# Patient Record
Sex: FEMALE | Race: WHITE | Employment: FULL TIME | URBAN - METROPOLITAN AREA
[De-identification: names, ages, dates, MRNs, and addresses within clinical notes are randomized per-mention and may not be internally consistent; named-entity substitution may affect disease eponyms.]

---

## 2017-02-25 ENCOUNTER — APPOINTMENT (OUTPATIENT)
Dept: CT IMAGING | Age: 27
DRG: 694 | End: 2017-02-25
Attending: NURSE PRACTITIONER
Payer: COMMERCIAL

## 2017-02-25 ENCOUNTER — HOSPITAL ENCOUNTER (INPATIENT)
Age: 27
LOS: 4 days | Discharge: HOME | DRG: 694 | End: 2017-03-02
Attending: EMERGENCY MEDICINE | Admitting: HOSPITALIST
Payer: COMMERCIAL

## 2017-02-25 DIAGNOSIS — N20.0 RENAL CALCULUS, LEFT: Primary | ICD-10-CM

## 2017-02-25 DIAGNOSIS — N12 PYELONEPHRITIS: ICD-10-CM

## 2017-02-25 DIAGNOSIS — R50.9 FEVER IN ADULT: ICD-10-CM

## 2017-02-25 LAB
ALBUMIN SERPL BCP-MCNC: 3.3 G/DL (ref 3.4–5)
ALBUMIN/GLOB SERPL: 1.2 {RATIO} (ref 0.8–1.7)
ALP SERPL-CCNC: 63 U/L (ref 45–117)
ALT SERPL-CCNC: 18 U/L (ref 13–56)
ANION GAP BLD CALC-SCNC: 9 MMOL/L (ref 3–18)
APPEARANCE UR: ABNORMAL
AST SERPL W P-5'-P-CCNC: 11 U/L (ref 15–37)
BACTERIA URNS QL MICRO: ABNORMAL /HPF
BASOPHILS # BLD AUTO: 0 K/UL (ref 0–0.06)
BASOPHILS # BLD: 0 % (ref 0–2)
BILIRUB SERPL-MCNC: 0.6 MG/DL (ref 0.2–1)
BILIRUB UR QL: NEGATIVE
BUN SERPL-MCNC: 15 MG/DL (ref 7–18)
BUN/CREAT SERPL: 14 (ref 12–20)
CALCIUM SERPL-MCNC: 7.9 MG/DL (ref 8.5–10.1)
CHLORIDE SERPL-SCNC: 104 MMOL/L (ref 100–108)
CO2 SERPL-SCNC: 24 MMOL/L (ref 21–32)
COLOR UR: YELLOW
CREAT SERPL-MCNC: 1.04 MG/DL (ref 0.6–1.3)
DIFFERENTIAL METHOD BLD: ABNORMAL
EOSINOPHIL # BLD: 0 K/UL (ref 0–0.4)
EOSINOPHIL NFR BLD: 0 % (ref 0–5)
EPITH CASTS URNS QL MICRO: ABNORMAL /LPF (ref 0–5)
ERYTHROCYTE [DISTWIDTH] IN BLOOD BY AUTOMATED COUNT: 13.2 % (ref 11.6–14.5)
GLOBULIN SER CALC-MCNC: 2.8 G/DL (ref 2–4)
GLUCOSE SERPL-MCNC: 150 MG/DL (ref 74–99)
GLUCOSE UR STRIP.AUTO-MCNC: NEGATIVE MG/DL
HCG SERPL QL: NEGATIVE
HCT VFR BLD AUTO: 42.1 % (ref 35–45)
HGB BLD-MCNC: 14.2 G/DL (ref 12–16)
HGB UR QL STRIP: ABNORMAL
KETONES UR QL STRIP.AUTO: NEGATIVE MG/DL
LEUKOCYTE ESTERASE UR QL STRIP.AUTO: ABNORMAL
LIPASE SERPL-CCNC: 96 U/L (ref 73–393)
LYMPHOCYTES # BLD AUTO: 6 % (ref 21–52)
LYMPHOCYTES # BLD: 0.7 K/UL (ref 0.9–3.6)
MCH RBC QN AUTO: 29.7 PG (ref 24–34)
MCHC RBC AUTO-ENTMCNC: 33.7 G/DL (ref 31–37)
MCV RBC AUTO: 88.1 FL (ref 74–97)
MONOCYTES # BLD: 0.3 K/UL (ref 0.05–1.2)
MONOCYTES NFR BLD AUTO: 2 % (ref 3–10)
NEUTS SEG # BLD: 10.2 K/UL (ref 1.8–8)
NEUTS SEG NFR BLD AUTO: 92 % (ref 40–73)
NITRITE UR QL STRIP.AUTO: NEGATIVE
PH UR STRIP: 5 [PH] (ref 5–8)
PLATELET # BLD AUTO: 185 K/UL (ref 135–420)
PMV BLD AUTO: 11.5 FL (ref 9.2–11.8)
POTASSIUM SERPL-SCNC: 3.5 MMOL/L (ref 3.5–5.5)
PROT SERPL-MCNC: 6.1 G/DL (ref 6.4–8.2)
PROT UR STRIP-MCNC: NEGATIVE MG/DL
RBC # BLD AUTO: 4.78 M/UL (ref 4.2–5.3)
RBC #/AREA URNS HPF: ABNORMAL /HPF (ref 0–5)
SODIUM SERPL-SCNC: 137 MMOL/L (ref 136–145)
SP GR UR REFRACTOMETRY: 1.02 (ref 1–1.03)
UROBILINOGEN UR QL STRIP.AUTO: 0.2 EU/DL (ref 0.2–1)
WBC # BLD AUTO: 11.2 K/UL (ref 4.6–13.2)
WBC URNS QL MICRO: ABNORMAL /HPF (ref 0–4)

## 2017-02-25 PROCEDURE — 99284 EMERGENCY DEPT VISIT MOD MDM: CPT

## 2017-02-25 PROCEDURE — 85025 COMPLETE CBC W/AUTO DIFF WBC: CPT | Performed by: NURSE PRACTITIONER

## 2017-02-25 PROCEDURE — 84703 CHORIONIC GONADOTROPIN ASSAY: CPT | Performed by: NURSE PRACTITIONER

## 2017-02-25 PROCEDURE — 96365 THER/PROPH/DIAG IV INF INIT: CPT

## 2017-02-25 PROCEDURE — 74011250636 HC RX REV CODE- 250/636: Performed by: NURSE PRACTITIONER

## 2017-02-25 PROCEDURE — 81001 URINALYSIS AUTO W/SCOPE: CPT | Performed by: EMERGENCY MEDICINE

## 2017-02-25 PROCEDURE — 87077 CULTURE AEROBIC IDENTIFY: CPT | Performed by: NURSE PRACTITIONER

## 2017-02-25 PROCEDURE — 87086 URINE CULTURE/COLONY COUNT: CPT | Performed by: NURSE PRACTITIONER

## 2017-02-25 PROCEDURE — 96376 TX/PRO/DX INJ SAME DRUG ADON: CPT

## 2017-02-25 PROCEDURE — 74176 CT ABD & PELVIS W/O CONTRAST: CPT

## 2017-02-25 PROCEDURE — 96375 TX/PRO/DX INJ NEW DRUG ADDON: CPT

## 2017-02-25 PROCEDURE — 87186 SC STD MICRODIL/AGAR DIL: CPT | Performed by: NURSE PRACTITIONER

## 2017-02-25 PROCEDURE — 83690 ASSAY OF LIPASE: CPT | Performed by: EMERGENCY MEDICINE

## 2017-02-25 PROCEDURE — 96361 HYDRATE IV INFUSION ADD-ON: CPT

## 2017-02-25 PROCEDURE — 74011250637 HC RX REV CODE- 250/637: Performed by: NURSE PRACTITIONER

## 2017-02-25 PROCEDURE — 80053 COMPREHEN METABOLIC PANEL: CPT | Performed by: EMERGENCY MEDICINE

## 2017-02-25 RX ORDER — MORPHINE SULFATE 4 MG/ML
4 INJECTION, SOLUTION INTRAMUSCULAR; INTRAVENOUS
Status: COMPLETED | OUTPATIENT
Start: 2017-02-25 | End: 2017-02-25

## 2017-02-25 RX ORDER — KETOROLAC TROMETHAMINE 30 MG/ML
30 INJECTION, SOLUTION INTRAMUSCULAR; INTRAVENOUS
Status: COMPLETED | OUTPATIENT
Start: 2017-02-25 | End: 2017-02-25

## 2017-02-25 RX ORDER — CIPROFLOXACIN 2 MG/ML
400 INJECTION, SOLUTION INTRAVENOUS
Status: COMPLETED | OUTPATIENT
Start: 2017-02-25 | End: 2017-02-25

## 2017-02-25 RX ORDER — ONDANSETRON 2 MG/ML
4 INJECTION INTRAMUSCULAR; INTRAVENOUS
Status: COMPLETED | OUTPATIENT
Start: 2017-02-25 | End: 2017-02-25

## 2017-02-25 RX ORDER — TAMSULOSIN HYDROCHLORIDE 0.4 MG/1
0.4 CAPSULE ORAL
Status: DISCONTINUED | OUTPATIENT
Start: 2017-02-25 | End: 2017-02-26

## 2017-02-25 RX ORDER — ACETAMINOPHEN 325 MG/1
650 TABLET ORAL
Status: COMPLETED | OUTPATIENT
Start: 2017-02-25 | End: 2017-02-25

## 2017-02-25 RX ORDER — ACETAMINOPHEN 325 MG/1
975 TABLET ORAL
Status: DISCONTINUED | OUTPATIENT
Start: 2017-02-25 | End: 2017-02-25

## 2017-02-25 RX ADMIN — KETOROLAC TROMETHAMINE 30 MG: 30 INJECTION, SOLUTION INTRAMUSCULAR at 19:52

## 2017-02-25 RX ADMIN — Medication 4 MG: at 19:54

## 2017-02-25 RX ADMIN — TAMSULOSIN HYDROCHLORIDE 0.4 MG: 0.4 CAPSULE ORAL at 23:51

## 2017-02-25 RX ADMIN — CIPROFLOXACIN 400 MG: 2 INJECTION, SOLUTION INTRAVENOUS at 21:20

## 2017-02-25 RX ADMIN — SODIUM CHLORIDE 1000 ML: 900 INJECTION, SOLUTION INTRAVENOUS at 21:21

## 2017-02-25 RX ADMIN — Medication 4 MG: at 22:03

## 2017-02-25 RX ADMIN — ACETAMINOPHEN 650 MG: 325 TABLET, FILM COATED ORAL at 19:59

## 2017-02-25 RX ADMIN — SODIUM CHLORIDE 1000 ML: 900 INJECTION, SOLUTION INTRAVENOUS at 19:50

## 2017-02-25 RX ADMIN — ONDANSETRON 4 MG: 2 INJECTION INTRAMUSCULAR; INTRAVENOUS at 22:02

## 2017-02-25 NOTE — IP AVS SNAPSHOT
[address] [place] [address] 
[phone] Patient: Jeff Frances MRN: [id number] :1990 You are allergic to the following Allergen Reactions Pcn (Penicillins) Rash Other (comments) Stomach cramps Recent Documentation Height  
  
  
  
  
  
 1.676 m Unresulted Labs Order Current Status CULTURE, BLOOD Preliminary result CULTURE, BLOOD Preliminary result Emergency Contacts Name Discharge Info Relation Home Work Mobile Yuri Edwards DISCHARGE CAREGIVER [3] Spouse [3] [phone] Judy Moon  Parent [1] [phone] About your hospitalization You were admitted on:  2017 You last received care in the:  [address] [place] [address] Road You were discharged on:  2017 Unit phone number:  [phone] Why you were hospitalized Your primary diagnosis was:  Not on File Your diagnoses also included:  Renal Calculi, Pyelonephritis Providers Seen During Your Hospitalizations Provider Role Specialty Primary office phone Calli Bearden DO Attending Provider Emergency Medicine [phone] Deisi Villavicencio MD Attending Provider [facility] [phone] Jaz Bergeron DO Attending Provider Internal Medicine [phone] Socorro Reyes MD Attending Provider Internal Medicine [phone] Your Primary Care Physician (PCP) Primary Care Physician Office Phone Office Fax [facility], [address] [place] [address] [phone] Follow-up Information Follow up With Details Comments Contact Info Brent Breen MD Schedule an appointment as soon as possible for a visit in 1 week  [facility] [address] [facility] [phone] 
[phone] Your Appointments 2017  9:15 AM EDT  
[facility] ([address]) [address] [address]  
[phone] Monday March 20, 2017  9:30 AM EDT  
POST OP with Marbin Kebede MD  
[facility]. Aron Mehta 98 ([facility]) [address] [address]  
[phone] Current Discharge Medication List  
  
START taking these medications Dose & Instructions Dispensing Information Comments Morning Noon Evening Bedtime  
 levoFLOXacin 750 mg tablet Commonly known as:  Threasa Mince Your next dose is: Today, Tomorrow Other:  _________ Dose:  750 mg Take 1 Tab by mouth daily. Quantity:  10 Tab Refills:  0  
     
   
   
   
  
 ondansetron 4 mg disintegrating tablet Commonly known as:  ZOFRAN ODT Your next dose is: Today, Tomorrow Other:  _________ Dose:  4 mg Take 1 Tab by mouth every eight (8) hours as needed. Quantity:  30 Tab Refills:  0  
     
   
   
   
  
 oxybutynin 5 mg tablet Commonly known as:  LNQZUSNF Your next dose is: Today, Tomorrow Other:  _________ Dose:  5 mg Take 1 Tab by mouth every eight (8) hours. Quantity:  60 Tab Refills:  0  
     
   
   
   
  
 oxyCODONE-acetaminophen 7.5-325 mg per tablet Commonly known as:  PERCOCET Your next dose is: Today, Tomorrow Other:  _________ Dose:  1 Tab Take 1 Tab by mouth every four (4) hours as needed for Pain. Max Daily Amount: 6 Tabs. Quantity:  30 Tab Refills:  0  
     
   
   
   
  
 tamsulosin 0.4 mg capsule Commonly known as:  FLOMAX Your next dose is: Today, Tomorrow Other:  _________ Dose:  0.4 mg Take 1 Cap by mouth every evening. Quantity:  30 Cap Refills:  0 CONTINUE these medications which have NOT CHANGED Dose & Instructions Dispensing Information Comments Morning Noon Evening Bedtime TRI-SPRINTEC (28) 0.18/0.215/0.25 mg-35 mcg (28) Tab Generic drug:  norgestimate-ethinyl estradiol Your next dose is: Today, Tomorrow Other:  _________ Take  by mouth. Refills:  0 STOP taking these medications HYDROcodone-acetaminophen 5-325 mg per tablet Commonly known as:  Hector Armstronge Where to Get Your Medications Information on where to get these meds will be given to you by the nurse or doctor. ! Ask your nurse or doctor about these medications  
  levoFLOXacin 750 mg tablet  
 ondansetron 4 mg disintegrating tablet  
 oxybutynin 5 mg tablet  
 oxyCODONE-acetaminophen 7.5-325 mg per tablet  
 tamsulosin 0.4 mg capsule Discharge Instructions DISCHARGE SUMMARY from Nurse The following personal items are in your possession at time of discharge: 
 
Dental Appliances: None Visual Aid: Glasses Home Medications: None Jewelry: Ring (2 silver ring with stones) Clothing: Shirt, Undergarments, Pants, Footwear Other Valuables: Cell Phone PATIENT INSTRUCTIONS: 
 
 
F-face looks uneven A-arms unable to move or move unevenly S-speech slurred or non-existent T-time-call 911 as soon as signs and symptoms begin-DO NOT go Back to bed or wait to see if you get better-TIME IS BRAIN. Warning Signs of HEART ATTACK Call 911 if you have these symptoms: 
? Chest discomfort. Most heart attacks involve discomfort in the center of the chest that lasts more than a few minutes, or that goes away and comes back. It can feel like uncomfortable pressure, squeezing, fullness, or pain. ? Discomfort in other areas of the upper body. Symptoms can include pain or discomfort in one or both arms, the back, neck, jaw, or stomach. ? Shortness of breath with or without chest discomfort. ? Other signs may include breaking out in a cold sweat, nausea, or lightheadedness. Don't wait more than five minutes to call [address]! Fast action can save your life. Calling 911 is almost always the fastest way to get lifesaving treatment. Emergency Medical Services staff can begin treatment when they arrive  up to an hour sooner than if someone gets to the hospital by car. The discharge information has been reviewed with the patient. The patient verbalized understanding. Discharge medications reviewed with the patient Kidney Stone: Care Instructions Your Care Instructions Kidney stones are formed when salts, minerals, and other substances normally found in the urine clump together. They can be as small as grains of sand or, rarely, as large as golf balls. While the stone is traveling through the ureter, which is the tube that carries urine from the kidney to the bladder, you will probably feel pain. The pain may be mild or very severe. You may also have some blood in your urine. As soon as the stone reaches the bladder, any intense pain should go away. If a stone is too large to pass on its own, you may need a medical procedure to help you pass the stone. The doctor has checked you carefully, but problems can develop later. If you notice any problems or new symptoms, get medical treatment right away. Follow-up care is a key part of your treatment and safety. Be sure to make and go to all appointments, and call your doctor if you are having problems. It's also a good idea to know your test results and keep a list of the medicines you take. How can you care for yourself at home?  
· Drink plenty of fluids, enough so that your urine is light yellow or clear like water. If you have kidney, heart, or liver disease and have to limit fluids, talk with your doctor before you increase the amount of fluids you drink. · Take pain medicines exactly as directed. Call your doctor if you think you are having a problem with your medicine. ¨ If the doctor gave you a prescription medicine for pain, take it as prescribed. ¨ If you are not taking a prescription pain medicine, ask your doctor if you can take an over-the-counter medicine. Read and follow all instructions on the label. · Your doctor may ask you to strain your urine so that you can collect your kidney stone when it passes. You can use a kitchen strainer or a tea strainer to catch the stone. Store it in a plastic bag until you see your doctor again. Preventing future kidney stones Some changes in your diet may help prevent kidney stones. Depending on the cause of your stones, your doctor may recommend that you: · Drink plenty of fluids, enough so that your urine is light yellow or clear like water. If you have kidney, heart, or liver disease and have to limit fluids, talk with your doctor before you increase the amount of fluids you drink. · Limit coffee, tea, and alcohol. Also avoid grapefruit juice. · Do not take more than the recommended daily dose of vitamins C and D. 
· Avoid antacids such as Gaviscon, Maalox, Mylanta, or Tums. · Limit the amount of salt (sodium) in your diet. · Eat a balanced diet that is not too high in protein. · Limit foods that are high in a substance called oxalate, which can cause kidney stones. These foods include dark green vegetables, rhubarb, chocolate, wheat bran, nuts, cranberries, and beans. When should you call for help? Call your doctor now or seek immediate medical care if: 
· You cannot keep down fluids. · Your pain gets worse. · You have a fever or chills. · You have new or worse pain in your back just below your rib cage (the flank area). · You have new or more blood in your urine. Watch closely for changes in your health, and be sure to contact your doctor if: 
· You do not get better as expected. Where can you learn more? Go to http://giovanny-stewart.info/. Enter L399 in the search box to learn more about \"Kidney Stone: Care Instructions. \" Current as of: November 20, 2015 Content Version: 11.1 © 0919-4130 [employer]. Care instructions adapted under license by [employer] on original products (which disclaims liability or warranty for this information). If you have questions about a medical condition or this instruction, always ask your healthcare professional. [address] any warranty or liability for your use of this information. and appropriate educational materials and side effects teaching were provided. Discharge Instructions Attachments/References LEVOFLOXACIN (BY MOUTH) (ENGLISH) Discharge Orders None TipTap Announcement We are excited to announce that we are making your provider's discharge notes available to you in TipTap. You will see these notes when they are completed and signed by the physician that discharged you from your recent hospital stay. If you have any questions or concerns about any information you see in TipTap, please call the Health Information Department where you were seen or reach out to your Primary Care Provider for more information about your plan of care. Introducing [facility]! Stefanie Nair introduces TipTap patient portal. Now you can access parts of your medical record, email your doctor's office, and request medication refills online. 1. In your internet browser, go to [email or web address] 2. Click on the First Time User? Click Here link in the Sign In box. You will see the New Member Sign Up page. 3. Enter your TipTap Access Code exactly as it appears below.  You will not need to use this code after youve completed the sign-up process. If you do not sign up before the expiration date, you must request a new code. · Property Owl Access Code: [email or web address] Expires: 5/26/2017  6:26 PM 
 
4. Enter the last four digits of your Social Security Number (xxxx) and Date of Birth (mm/dd/yyyy) as indicated and click Submit. You will be taken to the next sign-up page. 5. Create a Property Owl ID. This will be your Property Owl login ID and cannot be changed, so think of one that is secure and easy to remember. 6. Create a Property Owl password. You can change your password at any time. 7. Enter your Password Reset Question and Answer. This can be used at a later time if you forget your password. 8. Enter your e-mail address. You will receive e-mail notification when new information is available in [address] 9. Click Sign Up. You can now view and download portions of your medical record. 10. Click the Download Summary menu link to download a portable copy of your medical information. If you have questions, please visit the Frequently Asked Questions section of the Property Owl website. Remember, Property Owl is NOT to be used for urgent needs. For medical emergencies, dial 911. Now available from your iPhone and Android! General Information Please provide this summary of care documentation to your next provider. Patient Signature:  ____________________________________________________________ Date:  ____________________________________________________________  
  
Coleman Shelby Provider Signature:  ____________________________________________________________ Date:  ____________________________________________________________ More Information Levofloxacin (By mouth) Levofloxacin (hie-kod-XPWU-a-sin) Treats infections. This medicine is a quinolone antibiotic. Brand Name(s):Levaquin, Levaquin Leva-roni There may be other brand names for this medicine. When This Medicine Should Not Be Used: This medicine is not right for everyone. Do not use if you had an allergic reaction to levofloxacin or similar medicines. How to Use This Medicine:  
Liquid, Tablet · Your doctor will tell you how much medicine to use. Do not use more than directed. · Take your medicine at the same time each day. ¨ Tablet: Take the tablet with or without food. ¨ Liquid: Take the liquid medicine 1 hour before or 2 hours after you eat. Measure the oral liquid medicine with a marked measuring spoon, oral syringe, or medicine cup. · Take all of the medicine in your prescription to clear up your infection, even if you feel better after the first few doses. · Drink extra fluids so you will urinate more often and help prevent kidney problems. · This medicine should come with a Medication Guide. Ask your pharmacist for a copy if you do not have one. · Missed dose: Take a dose as soon as you remember. If it is almost time for your next dose, wait until then and take a regular dose. Do not take extra medicine to make up for a missed dose. · Store the medicine in a closed container at room temperature, away from heat, moisture, and direct light. Drugs and Foods to Avoid: Ask your doctor or pharmacist before using any other medicine, including over-the-counter medicines, vitamins, and herbal products. · Some medicines and foods can affect how levofloxacin works. Tell your doctor if you are using any of the following: ¨ Theophylline ¨ Steroid medicine (such as hydrocortisone, methylprednisolone, prednisone) ¨ Blood thinner (such as warfarin) ¨ Diabetes medicine ¨ NSAID pain or arthritis medicine (such as aspirin, diclofenac, ibuprofen, naproxen, celecoxib) ¨ Medicine for heart rhythm problems (such as quinidine, procainamide, amiodarone, sotalol) · Some minerals and medicines can keep your body from absorbing this medicine. You may need to take levofloxacin at least 2 hours before or after you take these medicines. These include antacids that contain magnesium or aluminum; magnesium, zinc, or iron supplements; sucralfate; and didanosine. Ask your pharmacist for more information. Warnings While Using This Medicine: · Tell your doctor if you are pregnant or breastfeeding, or if you have kidney disease, liver disease, diabetes, heart disease, heart rhythm problems (such as QT prolongation or a slow heartbeat), myasthenia gravis, or a history of seizures, epilepsy, head injury, or stroke. Tell your doctor if you have ever had tendon or joint problems, including rheumatoid arthritis, or if you have received a transplant. · This medicine may cause the following problems: 
¨ Tendinitis and tendon rupture (may happen after treatment ends) ¨ Liver damage ¨ Severe diarrhea ¨ Nerve damage in the arms or legs ¨ Heart rhythm changes ¨ Blood sugar level changes · This medicine may make you feel dizzy or lightheaded. Do not drive or do anything else that could be dangerous until you know how this medicine affects you. · This medicine can cause diarrhea. Call your doctor if the diarrhea becomes severe, does not stop, or is bloody. Do not take any medicine to stop diarrhea until you have talked to your doctor. Diarrhea can occur 2 months or more after you stop taking this medicine. · This medicine may make your skin more sensitive to sunlight. Wear sunscreen. Do not use sunlamps or tanning beds. · Call your doctor if your symptoms do not improve or if they get worse. · Tell any doctor or dentist who treats you that you are using this medicine. This medicine may affect certain medical test results. · Keep all medicine out of the reach of children. Never share your medicine with anyone. Possible Side Effects While Using This Medicine:  
Call your doctor right away if you notice any of these side effects: · Allergic reaction: Itching or hives, swelling in your face or hands, swelling or tingling in your mouth or throat, chest tightness, trouble breathing · Blistering, peeling, or red skin rash · Change in how much or how often you urinate · Dark urine or pale stools, nausea, vomiting, loss of appetite, stomach pain, yellow skin or eyes · Diarrhea that may contain blood · Fainting, dizziness, or lightheadedness · Fast, slow, or uneven heartbeat, chest pain · Numbness, tingling, or burning pain in your hands, arms, legs, or feet · Pain, stiffness, swelling, or bruises around your ankle, leg, shoulder, or other joint · Seizures, severe headache, unusual thoughts or behaviors, trouble sleeping, confusion · Unusual bleeding, bruising, or weakness If you notice these less serious side effects, talk with your doctor: · Mild headache · Mild nausea or diarrhea If you notice other side effects that you think are caused by this medicine, tell your doctor. Call your doctor for medical advice about side effects. You may report side effects to FDA at 3-779-FDA-0552 © 2016 [address] is for End User's use only and may not be sold, redistributed or otherwise used for commercial purposes. The above information is an  only. It is not intended as medical advice for individual conditions or treatments. Talk to your doctor, nurse or pharmacist before following any medical regimen to see if it is safe and effective for you.

## 2017-02-25 NOTE — IP AVS SNAPSHOT
Current Discharge Medication List  
  
Take these medications at their scheduled times Dose & Instructions Dispensing Information Comments Morning Noon Evening Bedtime  
 levoFLOXacin 750 mg tablet Commonly known as:  Blackwelljuju Bautista Your next dose is: Today, Tomorrow Other:  ____________ Dose:  750 mg Take 1 Tab by mouth daily. Quantity:  10 Tab Refills:  0  
     
   
   
   
  
 oxybutynin 5 mg tablet Commonly known as:  VLKKYYCF Your next dose is: Today, Tomorrow Other:  ____________ Dose:  5 mg Take 1 Tab by mouth every eight (8) hours. Quantity:  60 Tab Refills:  0  
     
   
   
   
  
 tamsulosin 0.4 mg capsule Commonly known as:  FLOMAX Your next dose is: Today, Tomorrow Other:  ____________ Dose:  0.4 mg Take 1 Cap by mouth every evening. Quantity:  30 Cap Refills:  0 Take these medications as needed Dose & Instructions Dispensing Information Comments Morning Noon Evening Bedtime  
 ondansetron 4 mg disintegrating tablet Commonly known as:  ZOFRAN ODT Your next dose is: Today, Tomorrow Other:  ____________ Dose:  4 mg Take 1 Tab by mouth every eight (8) hours as needed. Quantity:  30 Tab Refills:  0  
     
   
   
   
  
 oxyCODONE-acetaminophen 7.5-325 mg per tablet Commonly known as:  PERCOCET Your next dose is: Today, Tomorrow Other:  ____________ Dose:  1 Tab Take 1 Tab by mouth every four (4) hours as needed for Pain. Max Daily Amount: 6 Tabs. Quantity:  30 Tab Refills:  0 Take these medications as directed Dose & Instructions Dispensing Information Comments Morning Noon Evening Bedtime TRI-SPRINTEC (28) 0.18/0.215/0.25 mg-35 mcg (28) Tab Generic drug:  norgestimate-ethinyl estradiol Your next dose is: Today, Tomorrow Other:  ____________ Take  by mouth. Refills:  0 Where to Get Your Medications Information about where to get these medications is not yet available ! Ask your nurse or doctor about these medications  
  levoFLOXacin 750 mg tablet  
 ondansetron 4 mg disintegrating tablet  
 oxybutynin 5 mg tablet  
 oxyCODONE-acetaminophen 7.5-325 mg per tablet  
 tamsulosin 0.4 mg capsule

## 2017-02-26 ENCOUNTER — ANESTHESIA EVENT (OUTPATIENT)
Dept: SURGERY | Age: 27
DRG: 694 | End: 2017-02-26
Payer: COMMERCIAL

## 2017-02-26 ENCOUNTER — ANESTHESIA (OUTPATIENT)
Dept: SURGERY | Age: 27
DRG: 694 | End: 2017-02-26
Payer: COMMERCIAL

## 2017-02-26 ENCOUNTER — APPOINTMENT (OUTPATIENT)
Dept: GENERAL RADIOLOGY | Age: 27
DRG: 694 | End: 2017-02-26
Attending: UROLOGY
Payer: COMMERCIAL

## 2017-02-26 PROBLEM — N20.0 RENAL CALCULI: Status: ACTIVE | Noted: 2017-02-26

## 2017-02-26 PROBLEM — N12 PYELONEPHRITIS: Status: ACTIVE | Noted: 2017-02-26

## 2017-02-26 LAB
ALBUMIN SERPL BCP-MCNC: 2.6 G/DL (ref 3.4–5)
ALBUMIN/GLOB SERPL: 1.1 {RATIO} (ref 0.8–1.7)
ALP SERPL-CCNC: 52 U/L (ref 45–117)
ALT SERPL-CCNC: 12 U/L (ref 13–56)
ANION GAP BLD CALC-SCNC: 9 MMOL/L (ref 3–18)
AST SERPL W P-5'-P-CCNC: 12 U/L (ref 15–37)
BASOPHILS # BLD AUTO: 0 K/UL (ref 0–0.1)
BASOPHILS # BLD: 0 % (ref 0–3)
BILIRUB SERPL-MCNC: 0.6 MG/DL (ref 0.2–1)
BUN SERPL-MCNC: 11 MG/DL (ref 7–18)
BUN/CREAT SERPL: 9 (ref 12–20)
CALCIUM SERPL-MCNC: 6.8 MG/DL (ref 8.5–10.1)
CHLORIDE SERPL-SCNC: 113 MMOL/L (ref 100–108)
CO2 SERPL-SCNC: 19 MMOL/L (ref 21–32)
CREAT SERPL-MCNC: 1.17 MG/DL (ref 0.6–1.3)
DIFFERENTIAL METHOD BLD: ABNORMAL
EOSINOPHIL # BLD: 0 K/UL (ref 0–0.4)
EOSINOPHIL NFR BLD: 0 % (ref 0–5)
ERYTHROCYTE [DISTWIDTH] IN BLOOD BY AUTOMATED COUNT: 13.7 % (ref 11.6–14.5)
GLOBULIN SER CALC-MCNC: 2.3 G/DL (ref 2–4)
GLUCOSE SERPL-MCNC: 90 MG/DL (ref 74–99)
HCT VFR BLD AUTO: 36 % (ref 35–45)
HGB BLD-MCNC: 11.6 G/DL (ref 12–16)
LACTATE SERPL-SCNC: 0.8 MMOL/L (ref 0.4–2)
LYMPHOCYTES # BLD AUTO: 3 % (ref 20–51)
LYMPHOCYTES # BLD: 0.3 K/UL (ref 0.8–3.5)
MCH RBC QN AUTO: 29.1 PG (ref 24–34)
MCHC RBC AUTO-ENTMCNC: 32.2 G/DL (ref 31–37)
MCV RBC AUTO: 90.2 FL (ref 74–97)
MONOCYTES # BLD: 0.4 K/UL (ref 0–1)
MONOCYTES NFR BLD AUTO: 4 % (ref 2–9)
NEUTS BAND NFR BLD MANUAL: 9 % (ref 0–5)
NEUTS SEG # BLD: 8.7 K/UL (ref 1.8–8)
NEUTS SEG NFR BLD AUTO: 84 % (ref 42–75)
PLATELET # BLD AUTO: 113 K/UL (ref 135–420)
PMV BLD AUTO: 10.5 FL (ref 9.2–11.8)
POTASSIUM SERPL-SCNC: 3.4 MMOL/L (ref 3.5–5.5)
PROT SERPL-MCNC: 4.9 G/DL (ref 6.4–8.2)
RBC # BLD AUTO: 3.99 M/UL (ref 4.2–5.3)
RBC MORPH BLD: ABNORMAL
SODIUM SERPL-SCNC: 141 MMOL/L (ref 136–145)
WBC # BLD AUTO: 10.4 K/UL (ref 4.6–13.2)

## 2017-02-26 PROCEDURE — 85025 COMPLETE CBC W/AUTO DIFF WBC: CPT | Performed by: UROLOGY

## 2017-02-26 PROCEDURE — 0TJB8ZZ INSPECTION OF BLADDER, VIA NATURAL OR ARTIFICIAL OPENING ENDOSCOPIC: ICD-10-PCS | Performed by: UROLOGY

## 2017-02-26 PROCEDURE — 74011250636 HC RX REV CODE- 250/636: Performed by: NURSE PRACTITIONER

## 2017-02-26 PROCEDURE — 77030034850: Performed by: UROLOGY

## 2017-02-26 PROCEDURE — 77030032490 HC SLV COMPR SCD KNE COVD -B: Performed by: UROLOGY

## 2017-02-26 PROCEDURE — 77030012884 HC SLV COMPR SCD MTEK -B: Performed by: UROLOGY

## 2017-02-26 PROCEDURE — 76060000032 HC ANESTHESIA 0.5 TO 1 HR: Performed by: UROLOGY

## 2017-02-26 PROCEDURE — 77030020263 HC SOL INJ SOD CL0.9% LFCR 1000ML

## 2017-02-26 PROCEDURE — 74011250636 HC RX REV CODE- 250/636

## 2017-02-26 PROCEDURE — 87040 BLOOD CULTURE FOR BACTERIA: CPT | Performed by: UROLOGY

## 2017-02-26 PROCEDURE — 65270000029 HC RM PRIVATE

## 2017-02-26 PROCEDURE — 74011250636 HC RX REV CODE- 250/636: Performed by: UROLOGY

## 2017-02-26 PROCEDURE — 77030018846 HC SOL IRR STRL H20 ICUM -A: Performed by: UROLOGY

## 2017-02-26 PROCEDURE — 80053 COMPREHEN METABOLIC PANEL: CPT | Performed by: UROLOGY

## 2017-02-26 PROCEDURE — 36415 COLL VENOUS BLD VENIPUNCTURE: CPT | Performed by: UROLOGY

## 2017-02-26 PROCEDURE — 71010 XR CHEST PORT: CPT

## 2017-02-26 PROCEDURE — 87086 URINE CULTURE/COLONY COUNT: CPT | Performed by: UROLOGY

## 2017-02-26 PROCEDURE — 74011000250 HC RX REV CODE- 250

## 2017-02-26 PROCEDURE — 77030018836 HC SOL IRR NACL ICUM -A: Performed by: UROLOGY

## 2017-02-26 PROCEDURE — 87186 SC STD MICRODIL/AGAR DIL: CPT | Performed by: UROLOGY

## 2017-02-26 PROCEDURE — C2617 STENT, NON-COR, TEM W/O DEL: HCPCS | Performed by: UROLOGY

## 2017-02-26 PROCEDURE — 74011250637 HC RX REV CODE- 250/637: Performed by: NURSE PRACTITIONER

## 2017-02-26 PROCEDURE — C1769 GUIDE WIRE: HCPCS | Performed by: UROLOGY

## 2017-02-26 PROCEDURE — 0T778DZ DILATION OF LEFT URETER WITH INTRALUMINAL DEVICE, VIA NATURAL OR ARTIFICIAL OPENING ENDOSCOPIC: ICD-10-PCS | Performed by: UROLOGY

## 2017-02-26 PROCEDURE — 74011250636 HC RX REV CODE- 250/636: Performed by: HOSPITALIST

## 2017-02-26 PROCEDURE — 76010000138 HC OR TIME 0.5 TO 1 HR: Performed by: UROLOGY

## 2017-02-26 PROCEDURE — 77030018832 HC SOL IRR H20 ICUM -A: Performed by: UROLOGY

## 2017-02-26 PROCEDURE — 76210000006 HC OR PH I REC 0.5 TO 1 HR: Performed by: UROLOGY

## 2017-02-26 PROCEDURE — 77030012510 HC MSK AIRWY LMA TELE -B: Performed by: ANESTHESIOLOGY

## 2017-02-26 PROCEDURE — 74011250637 HC RX REV CODE- 250/637: Performed by: HOSPITALIST

## 2017-02-26 PROCEDURE — BT1FZZZ FLUOROSCOPY OF LEFT KIDNEY, URETER AND BLADDER: ICD-10-PCS | Performed by: UROLOGY

## 2017-02-26 PROCEDURE — 0T918ZX DRAINAGE OF LEFT KIDNEY, VIA NATURAL OR ARTIFICIAL OPENING ENDOSCOPIC, DIAGNOSTIC: ICD-10-PCS | Performed by: UROLOGY

## 2017-02-26 PROCEDURE — 74011636320 HC RX REV CODE- 636/320: Performed by: UROLOGY

## 2017-02-26 PROCEDURE — C1758 CATHETER, URETERAL: HCPCS | Performed by: UROLOGY

## 2017-02-26 PROCEDURE — 87077 CULTURE AEROBIC IDENTIFY: CPT | Performed by: UROLOGY

## 2017-02-26 PROCEDURE — 83605 ASSAY OF LACTIC ACID: CPT | Performed by: UROLOGY

## 2017-02-26 PROCEDURE — 93005 ELECTROCARDIOGRAM TRACING: CPT

## 2017-02-26 PROCEDURE — 77030012961 HC IRR KT CYSTO/TUR ICUM -A: Performed by: UROLOGY

## 2017-02-26 DEVICE — URETERAL STENT
Type: IMPLANTABLE DEVICE | Site: URETER | Status: FUNCTIONAL
Brand: POLARIS™ ULTRA

## 2017-02-26 RX ORDER — PROPOFOL 10 MG/ML
INJECTION, EMULSION INTRAVENOUS AS NEEDED
Status: DISCONTINUED | OUTPATIENT
Start: 2017-02-26 | End: 2017-02-26 | Stop reason: HOSPADM

## 2017-02-26 RX ORDER — DEXAMETHASONE SODIUM PHOSPHATE 4 MG/ML
INJECTION, SOLUTION INTRA-ARTICULAR; INTRALESIONAL; INTRAMUSCULAR; INTRAVENOUS; SOFT TISSUE AS NEEDED
Status: DISCONTINUED | OUTPATIENT
Start: 2017-02-26 | End: 2017-02-26 | Stop reason: HOSPADM

## 2017-02-26 RX ORDER — SODIUM CHLORIDE 9 MG/ML
500 INJECTION, SOLUTION INTRAVENOUS ONCE
Status: COMPLETED | OUTPATIENT
Start: 2017-02-26 | End: 2017-02-26

## 2017-02-26 RX ORDER — KETOROLAC TROMETHAMINE 30 MG/ML
30 INJECTION, SOLUTION INTRAMUSCULAR; INTRAVENOUS
Status: COMPLETED | OUTPATIENT
Start: 2017-02-26 | End: 2017-02-26

## 2017-02-26 RX ORDER — LIDOCAINE HYDROCHLORIDE 20 MG/ML
INJECTION, SOLUTION EPIDURAL; INFILTRATION; INTRACAUDAL; PERINEURAL AS NEEDED
Status: DISCONTINUED | OUTPATIENT
Start: 2017-02-26 | End: 2017-02-26 | Stop reason: HOSPADM

## 2017-02-26 RX ORDER — HYDROMORPHONE HYDROCHLORIDE 2 MG/ML
0.5 INJECTION, SOLUTION INTRAMUSCULAR; INTRAVENOUS; SUBCUTANEOUS
Status: DISCONTINUED | OUTPATIENT
Start: 2017-02-26 | End: 2017-02-26 | Stop reason: HOSPADM

## 2017-02-26 RX ORDER — ACETAMINOPHEN 325 MG/1
650 TABLET ORAL
Status: DISCONTINUED | OUTPATIENT
Start: 2017-02-26 | End: 2017-03-02 | Stop reason: HOSPADM

## 2017-02-26 RX ORDER — SODIUM CHLORIDE 9 MG/ML
150 INJECTION, SOLUTION INTRAVENOUS CONTINUOUS
Status: DISCONTINUED | OUTPATIENT
Start: 2017-02-26 | End: 2017-03-02 | Stop reason: HOSPADM

## 2017-02-26 RX ORDER — ACETAMINOPHEN 325 MG/1
650 TABLET ORAL AS NEEDED
Status: DISCONTINUED | OUTPATIENT
Start: 2017-02-26 | End: 2017-02-26

## 2017-02-26 RX ORDER — SODIUM CHLORIDE 9 MG/ML
100 INJECTION, SOLUTION INTRAVENOUS CONTINUOUS
Status: DISCONTINUED | OUTPATIENT
Start: 2017-02-26 | End: 2017-02-26 | Stop reason: HOSPADM

## 2017-02-26 RX ORDER — HYDROMORPHONE HYDROCHLORIDE 1 MG/ML
INJECTION, SOLUTION INTRAMUSCULAR; INTRAVENOUS; SUBCUTANEOUS AS NEEDED
Status: DISCONTINUED | OUTPATIENT
Start: 2017-02-26 | End: 2017-02-26 | Stop reason: HOSPADM

## 2017-02-26 RX ORDER — SODIUM CHLORIDE 0.9 % (FLUSH) 0.9 %
5-10 SYRINGE (ML) INJECTION AS NEEDED
Status: DISCONTINUED | OUTPATIENT
Start: 2017-02-26 | End: 2017-03-02 | Stop reason: HOSPADM

## 2017-02-26 RX ORDER — MIDAZOLAM HYDROCHLORIDE 1 MG/ML
INJECTION, SOLUTION INTRAMUSCULAR; INTRAVENOUS AS NEEDED
Status: DISCONTINUED | OUTPATIENT
Start: 2017-02-26 | End: 2017-02-26 | Stop reason: HOSPADM

## 2017-02-26 RX ORDER — ONDANSETRON 2 MG/ML
INJECTION INTRAMUSCULAR; INTRAVENOUS AS NEEDED
Status: DISCONTINUED | OUTPATIENT
Start: 2017-02-26 | End: 2017-02-26 | Stop reason: HOSPADM

## 2017-02-26 RX ORDER — SODIUM CHLORIDE 9 MG/ML
100 INJECTION, SOLUTION INTRAVENOUS CONTINUOUS
Status: DISCONTINUED | OUTPATIENT
Start: 2017-02-26 | End: 2017-02-26

## 2017-02-26 RX ORDER — MORPHINE SULFATE 2 MG/ML
2 INJECTION, SOLUTION INTRAMUSCULAR; INTRAVENOUS
Status: COMPLETED | OUTPATIENT
Start: 2017-02-26 | End: 2017-02-26

## 2017-02-26 RX ORDER — ONDANSETRON 2 MG/ML
4 INJECTION INTRAMUSCULAR; INTRAVENOUS
Status: DISCONTINUED | OUTPATIENT
Start: 2017-02-26 | End: 2017-02-26 | Stop reason: HOSPADM

## 2017-02-26 RX ORDER — FENTANYL CITRATE 50 UG/ML
INJECTION, SOLUTION INTRAMUSCULAR; INTRAVENOUS AS NEEDED
Status: DISCONTINUED | OUTPATIENT
Start: 2017-02-26 | End: 2017-02-26 | Stop reason: HOSPADM

## 2017-02-26 RX ORDER — ACETAMINOPHEN 325 MG/1
650 TABLET ORAL
Status: COMPLETED | OUTPATIENT
Start: 2017-02-26 | End: 2017-02-26

## 2017-02-26 RX ORDER — HYDROMORPHONE HYDROCHLORIDE 1 MG/ML
0.5 INJECTION, SOLUTION INTRAMUSCULAR; INTRAVENOUS; SUBCUTANEOUS
Status: DISCONTINUED | OUTPATIENT
Start: 2017-02-26 | End: 2017-02-27

## 2017-02-26 RX ORDER — LEVOFLOXACIN 5 MG/ML
750 INJECTION, SOLUTION INTRAVENOUS EVERY 24 HOURS
Status: DISCONTINUED | OUTPATIENT
Start: 2017-02-26 | End: 2017-03-02 | Stop reason: HOSPADM

## 2017-02-26 RX ORDER — NALOXONE HYDROCHLORIDE 0.4 MG/ML
0.2 INJECTION, SOLUTION INTRAMUSCULAR; INTRAVENOUS; SUBCUTANEOUS AS NEEDED
Status: DISCONTINUED | OUTPATIENT
Start: 2017-02-26 | End: 2017-02-26 | Stop reason: HOSPADM

## 2017-02-26 RX ORDER — FENTANYL CITRATE 50 UG/ML
50 INJECTION, SOLUTION INTRAMUSCULAR; INTRAVENOUS
Status: DISCONTINUED | OUTPATIENT
Start: 2017-02-26 | End: 2017-02-26 | Stop reason: HOSPADM

## 2017-02-26 RX ADMIN — SODIUM CHLORIDE 150 ML/HR: 900 INJECTION, SOLUTION INTRAVENOUS at 06:23

## 2017-02-26 RX ADMIN — KETOROLAC TROMETHAMINE 30 MG: 30 INJECTION, SOLUTION INTRAMUSCULAR at 05:30

## 2017-02-26 RX ADMIN — SODIUM CHLORIDE 150 ML/HR: 900 INJECTION, SOLUTION INTRAVENOUS at 02:49

## 2017-02-26 RX ADMIN — DEXAMETHASONE SODIUM PHOSPHATE 4 MG: 4 INJECTION, SOLUTION INTRA-ARTICULAR; INTRALESIONAL; INTRAMUSCULAR; INTRAVENOUS; SOFT TISSUE at 21:34

## 2017-02-26 RX ADMIN — SODIUM CHLORIDE 500 ML: 900 INJECTION, SOLUTION INTRAVENOUS at 13:15

## 2017-02-26 RX ADMIN — LIDOCAINE HYDROCHLORIDE 60 MG: 20 INJECTION, SOLUTION EPIDURAL; INFILTRATION; INTRACAUDAL; PERINEURAL at 21:20

## 2017-02-26 RX ADMIN — ACETAMINOPHEN 650 MG: 325 TABLET, FILM COATED ORAL at 18:17

## 2017-02-26 RX ADMIN — HYDROMORPHONE HYDROCHLORIDE 0.5 MG: 1 INJECTION, SOLUTION INTRAMUSCULAR; INTRAVENOUS; SUBCUTANEOUS at 21:39

## 2017-02-26 RX ADMIN — HYDROMORPHONE HYDROCHLORIDE 0.5 MG: 1 INJECTION, SOLUTION INTRAMUSCULAR; INTRAVENOUS; SUBCUTANEOUS at 23:22

## 2017-02-26 RX ADMIN — HYDROMORPHONE HYDROCHLORIDE 0.5 MG: 1 INJECTION, SOLUTION INTRAMUSCULAR; INTRAVENOUS; SUBCUTANEOUS at 15:02

## 2017-02-26 RX ADMIN — MIDAZOLAM HYDROCHLORIDE 2 MG: 1 INJECTION, SOLUTION INTRAMUSCULAR; INTRAVENOUS at 21:10

## 2017-02-26 RX ADMIN — SODIUM CHLORIDE 150 ML/HR: 900 INJECTION, SOLUTION INTRAVENOUS at 17:14

## 2017-02-26 RX ADMIN — HYDROMORPHONE HYDROCHLORIDE 0.5 MG: 1 INJECTION, SOLUTION INTRAMUSCULAR; INTRAVENOUS; SUBCUTANEOUS at 11:25

## 2017-02-26 RX ADMIN — ACETAMINOPHEN 650 MG: 325 TABLET, FILM COATED ORAL at 05:30

## 2017-02-26 RX ADMIN — LEVOFLOXACIN 750 MG: 5 INJECTION, SOLUTION INTRAVENOUS at 17:43

## 2017-02-26 RX ADMIN — MORPHINE SULFATE 2 MG: 2 INJECTION, SOLUTION INTRAMUSCULAR; INTRAVENOUS at 02:48

## 2017-02-26 RX ADMIN — SODIUM CHLORIDE 2313 ML: 900 INJECTION, SOLUTION INTRAVENOUS at 23:00

## 2017-02-26 RX ADMIN — FENTANYL CITRATE 50 MCG: 50 INJECTION, SOLUTION INTRAMUSCULAR; INTRAVENOUS at 21:30

## 2017-02-26 RX ADMIN — FENTANYL CITRATE 50 MCG: 50 INJECTION, SOLUTION INTRAMUSCULAR; INTRAVENOUS at 21:20

## 2017-02-26 RX ADMIN — ONDANSETRON 4 MG: 2 INJECTION INTRAMUSCULAR; INTRAVENOUS at 21:20

## 2017-02-26 RX ADMIN — PROPOFOL 150 MG: 10 INJECTION, EMULSION INTRAVENOUS at 21:20

## 2017-02-26 RX ADMIN — ACETAMINOPHEN 650 MG: 325 TABLET, FILM COATED ORAL at 13:00

## 2017-02-26 NOTE — PROGRESS NOTES
Assumed care; Pt resting in bed; no distress noted; Pt pain dull, but bert; bed in low position; Side rails up x 3; Call light and personal belonging within reach. Will continue to monitor. 1030: Pt requesting update on when urology will be coming to see. Pt hungry and wanting the eat. Dr Daniela Rodriguez paged. 1045: No return call. Repaged  1100: No return call. Repaged. 1100: Surgery(stent placement) today scheduled between 2-5pm. Pt has been eating ice chips. Doctor aware. NPO now. CHG wipes provided to pt.   1300: Pt appear flushed. Vitals noted 103. 1. Dr Ugarte notified of mews Score. 500ml Bolus and give Tylenol. 1356: Post bolus Temp 102.4. Pt verbalizes she feels much better. 1501: Recheck Temp 100.8. Per pt, anesthesiologist has been by to access pt. Will continue to monitor. 1600: Recheck temp 100.7. Pt anxious about surgery. Emotional support provided. 1700:  Temp Recheck. Elevated. Dr Gunner Howard notified. Vitals expected. See Mar for intervention. 1710: Pt on OR schedule. Unable to give time at this moment. 1720: Dr Nirav Lr at bedside. Consent signed. Witnessed by Ana Emery RN. Pt alert and orientated x 4. Surgery for 7pm.  6598: Temp recheck. Improving. Tylenol Prn given. 1920: OR contacted. Surgery still ongoing. Cannot provide a time at this moment.    [address]

## 2017-02-26 NOTE — ANESTHESIA PREPROCEDURE EVALUATION
Anesthetic History   No history of anesthetic complications            Review of Systems / Medical History  Patient summary reviewed and pertinent labs reviewed    Pulmonary  Within defined limits                 Neuro/Psych   Within defined limits           Cardiovascular  Within defined limits                Exercise tolerance: >4 METS     GI/Hepatic/Renal  Within defined limits              Endo/Other  Within defined limits           Other Findings              Physical Exam    Airway  Mallampati: II  TM Distance: 4 - 6 cm  Neck ROM: normal range of motion   Mouth opening: Normal     Cardiovascular  Regular rate and rhythm,  S1 and S2 normal,  no murmur, click, rub, or gallop  Rhythm: regular  Rate: normal         Dental    Dentition: Lower dentition intact and Upper dentition intact     Pulmonary  Breath sounds clear to auscultation               Abdominal  GI exam deferred       Other Findings            Anesthetic Plan    ASA: 1  Anesthesia type: general          Induction: Intravenous  Anesthetic plan and risks discussed with: Patient

## 2017-02-26 NOTE — PROGRESS NOTES
[place]   Discharge Planning/ Assessment    Reasons for Intervention: Spoke with pt, lives with spouse, designates him for dcp. Independent with adls and amb. Employed, insured. No dme. pcp Dr Belén Solis. Plan home.     High Risk Criteria  [] Yes  [x]No   Physician Referral  [] Yes  [x]No        Date    Nursing Referral  [] Yes  [x]No        Date    Patient/Family Request  [] Yes  [x]No        Date       Resources:    Medicare  [] Yes  []No   Medicaid  [] Yes  []No   No Resources  [] Yes  []No   Private Insurance  [x] Yes  []No    Name/Phone Number    Other  [] Yes  []No        (i.e. Workman's Comp)         Prior Services:    Prior Services  [] Yes  [x]No   Home Health  [] Yes  []No   [address]  [] Yes  []No        Number of [address]  [] Yes  []No       Meals on Wheels  [] Yes  []No   Office on Aging  [] Yes  []No   Transportation Services  [] Yes  []No   Nursing Home  [] Yes  []No        Nursing Home Name    [address]  [] Yes  []No        [address] Name    Other       Information Source:      Information obtained from  [x] Patient  [] Parent   [] [address]  [] Child  [] Spouse   [] Significant Other/Partner   [] Friend      [] EMS    [] Nursing Home Chart          [] Other:   Chart Review  [] Yes  []No     Family/Support System:    Patient lives with  [] Alone    [x] Spouse   [] Significant Other  [] Children  [] Caretaker   [] Parent  [] Sibling     [] Other       Other Support System:    Is the patient responsible for care of others  [] Yes  [x]No   Information of person caring for patient on  discharge    Managers financial affairs independently  [x] Yes  []No   If no, explain:      Status Prior to Admission:    Mental Status  [] Awake  [] Alert  [x] Oriented  [] Quiet/Calm [] Lethargic/Sedated   [] Disoriented  [] Restless/Anxious  [] Combative   Personal Care  [] Dependent  [x] Independent Personal Care  [] Requires Assistance   Meal Preparation Ability  [x] Independent   [] Standby Assistance   [] Minimal Assistance   [] Moderate Assistance  [] Maximum Assistance     [] Total Assistance   Chores  [x] Independent with Chores   [] N/A Nursing Home Resident   [] Requires Assistance   Bowel/Bladder  [x] Continent  [] Catheter  [] Incontinent  [] Ostomy Self-Care    [] Urine Diversion Self-Care  [] Maximum Assistance     [] Total Assistance   Number of Persons needed for assistance    DME at home  [] Alistair Chatman  [] Filiberto Chatman   [] Commode    [] Bathroom/Grab Bars  [] Hospital Bed  [] Nebulizer  [] Oxygen           [] Raised Toilet Seat  [] Shower Chair  [] Side Rails for Bed   [] Tub Transfer Bench   [] Mark Anthony Gtz  [] Vicente Luna      [] Other:   Vendor      Treatment Presently Receiving:    Current Treatments  [] Chemotherapy  [] Dialysis  [] Insulin  [] IVAB [] IVF   [] O2  [] PCA   [] PT   [] RT   [] Tube Feedings   [] Wound Care     Psychosocial Evaluation:    Verbalized Knowledge of Disease Process  [x] Patient  []Family   Coping with Disease Process  [] Patient  []Family   Requires Further Counseling Coping with Disease Process  [] Patient  []Family     Identified Projected Needs:    Home Health Aid  [] Yes  [x]No   Transportation  [] Yes  [x]No   Education  [] Yes  [x]No        Specific Education     Financial Counseling  [] Yes  [x]No   Inability to Care for Self/Will Require 24 hour care  [] Yes  [x]No   Pain Management  [] Yes  [x]No   Home Infusion Therapy  [] Yes  [x]No   Oxygen Therapy  [] Yes  [x]No   DME  [] Yes  [x]No   Long Term Care Placement  [] Yes  [x]No   Rehab  [] Yes  [x]No   Physical Therapy  [] Yes  [x]No   Needs Anticipated At This Time  [] Yes  [x]No     Intra-Hospital Referral:    [facility]  [] Yes  [x]No     [] Yes  [x]No   Patient Representative  [] Yes  [x]No   Staff for Teaching Needs  [] Yes  [x]No   Specialty Teaching Needs     Diabetic Educator  [] Yes  [x]No Referral for Diabetic Educator Needed  [] Yes  [x]No  If Yes, place order for Nutritionist or Diabetic Consult     Tentative Discharge Plan:    Home with No Services  [x] Yes  []No   Home with [address]  [] Yes  []No        If Yes, specify type    Home Care Program  [] Yes  []No        If Yes, specify type    Meals on Wheels  [] Yes  []No   Office of Aging  [] Yes  []No   NHP  [] Yes  []No   Return to the [address]  [] Yes  []No   Rehab Therapy  [] Yes  []No   Acute Rehab  [] Yes  []No   Subacute Rehab  [] Yes  []No   Private Care  [] Yes  []No   Substance Abuse Referral  [] Yes  []No   Transportation  [] Yes  []No   Chore Service  [] Yes  []No   Inpatient Hospice  [] Yes  []No   OP RT  [] Yes  [] No   OP Hemo  [] Yes  [] No   OP PT  [] Yes  []No   Support Group  [] Yes  []No   Reach to Recovery  [] Yes  []No   OP Oncology Clinic  [] Yes  []No   Clinic Appointment  [] Yes  []No   DME  [] Yes  []No   Comments    Name of D/C Planner or  Given to Patient or Family Jena romero rn cm    Phone Number [phone]        Extension    Date 2/26/17   Time    If you are discharged home, whom do you designate to participate in your discharge plan and receive any information needed?      Enter name of designee         Phone # of designee         Address of designee         Updated         Patient refused to designate any           individual

## 2017-02-26 NOTE — H&P
[address]    Name:  Amber Johnson  MR#:  [id number]  :  1990  Account #:  [de-identified]  Date of Adm:  2017      HISTORY OF PRESENT ILLNESS: The patient is a 29-year-old  female who presents to the ED complaining of left flank pain. The  patient states that the pain began around 10:30 yesterday morning. It  persisted throughout the day, she decided to come to the ED. The  patient says that the pain was 7/10, it is a dull pain, constant. She  reports a possible fever right before coming into the ED. She has never  had symptoms like this before. While in the ER, the patient was found  to have renal colliculi in the left ureter and pyelonephritis. She was also  tachycardic. The patient started on IV antibiotics and Flomax with IV  fluids. Urology was consulted and will see the patient in a.m. Past Medical History:   Diagnosis Date    Kidney stone        Past Surgical History:   Procedure Laterality Date    HX OTHER SURGICAL      oral surgery       Social History     Social History    Marital status:      Spouse name: N/A    Number of children: N/A    Years of education: N/A     Occupational History    Not on file. Social History Main Topics    Smoking status: Never Smoker    Smokeless tobacco: Not on file    Alcohol use Yes      Comment: occ    Drug use: No    Sexual activity: Not on file     Other Topics Concern    Not on file     Social History Narrative       Family History   Problem Relation Age of Onset    Thyroid Disease Mother     Asthma Father     Asthma Brother     Cancer Maternal Aunt     Breast Cancer Maternal Grandmother        Allergies   Allergen Reactions    Pcn [Penicillins] Rash and Other (comments)     Stomach cramps       Review of Systems:  Positive in bold.   CONST: Fever, weight loss, fatigue or chills  GI: Nausea, vomiting, left Flank pain, change in bowel habits, hematochezia, melena, and GERD   INTEG: Dermatitis, abnormal moles  HEENT: Recent changes in vision, vertigo, epistaxis, dysphagia, hoarseness  CV: Chest pain, palpitations, HTN, edema and varicosities  RESP: Cough, shortness of breath, wheezing, hemoptysis, snoring. : Hematuria, dysuria, frequency, urgency, retention, incontinence   MS: Weakness, joint pain and arthritis  ENDO: Diabetes, thyroid disease, polyuria, polydipsia, polyphagia, poor wound healing, heat intolerance, cold intolerance  LYMPH/HEME: Anemia, bruising and history of blood transfusions  NEURO: Dizziness, headache, fainting, seizures and stroke  PSYCH: Anxiety , depression    Physical Exam:      Visit Vitals    /61    Pulse 94    Temp (!) 103.2 °F (39.6 °C)    Resp 18    Ht 5' 6\" (1.676 m)    Wt 77.1 kg (170 lb)    SpO2 100%    BMI 27.44 kg/m2       Physical Exam:  Gen:  No distress, alert, awake and oriented x 4  HEENT:  Normal cephalic atraumatic, extra-occular movements are intact. Neck:  Supple, No JVD  Lungs:  Clear bilaterally, no wheeze, no rales, normal effort  Cardiovascular:  Tachycardia with normal rhythm, normal S1 and S2, no audible murmur. Capillary refill: < 3 seconds. Abdomen:  Soft, non tender, non distended, normal bowel sounds, no guarding. Extremities:  Well perfused, no cyanosis, no edema  Neurological:  Awake and alert, CN's are intact, normal strength throughout extremities  Skin:  No rashes or moles. Turgor and color normal  Mental Status:  Normal thought process, does not appear anxious      Laboratory Studies: All lab results for the last 24 hours reviewed. Assessment/Plan     Active Problems:    Renal calculi (2/26/2017)      Pyelonephritis (2/26/2017)        PLAN:  30 yo female admitted for renal caliculi and pyelonephritis.     Infectious/Nephro: pyelonephritis, luke caliculi   Urology consult   Continue IV antibiotics- Cipro   Continue flomax   Repeat lactic acid levels every 4 hours until normal   Tylenol for fever   Toradol for pain    CV: tachycardia   Monitor via tele. Heme:  DVT prophylaxis   Bilateral lower extremity compression devices    Misc:  FULL CODE   Ambulate with assistance. Monitor intake and output   Monitor vitals as per unit   Replace electrolytes as needed. Follow up am labs.            Mario Monte MD    AR / CD  D:  02/26/2017   05:07  T:  02/26/2017   05:28  Job #:  [id number]

## 2017-02-26 NOTE — PROGRESS NOTES
Patient has designated ____________spouse____________ to participate in his/her discharge plan and to receive any needed information.      Name: Tabatha Waters  Address:  Phone number:[phone]

## 2017-02-26 NOTE — PROGRESS NOTES
This was an early morning admit by Dr. Yolanda Jack  Pt seen this morning  Admitted for pyelo and renal calculi  Urology consulted  Remains on IV antibiotics and pain control  Admits to pain but currently controlled w/ meds  Otherwise, OK.      Sonia Dorsey, DO  Internal Medicine, Hospitalist  Pager: [phone]

## 2017-02-26 NOTE — ED NOTES
Purposeful rounding completed:    Side rails up x 2:  YES  Bed in low position and wheels locked: YES  Call bell within reach: YES  Comfort addressed: YES    Toileting needs addressed: YES  Plan of care reviewed/updated with patient and or family members: YES  IV site assessed: YES  Pain assessed and addressed: YES. Patient remains asleep, significant other at the bedside.

## 2017-02-26 NOTE — PROGRESS NOTES
Problem: Discharge Planning  Goal: *Discharge to safe environment  Outcome: Progressing Towards Goal  home

## 2017-02-26 NOTE — ED NOTES
Purposeful rounding completed:    Side rails up x 2:  YES  Bed in low position and wheels locked: YES  Call bell within reach: YES  Comfort addressed: YES    Toileting needs addressed: YES  Plan of care reviewed/updated with patient and or family members: YES  IV site assessed: YES  Pain assessed and addressed: YES. Patient asleep at this time.

## 2017-02-26 NOTE — CONSULTS
Consult Note    Patient: Minesh Celis               Sex: female          DOA: 2/25/2017         YOB: 1990      Age:  31 y.o.        LOS:  LOS: 0 days              Referring provider: Shelby Kumar DO    HPI:     Minesh Celis is a 31 y.o. female who has been seen for left ureteral calculi with associated UTI/Pyelonephritis; colic pain. Complains of left flank pain, colicky in nature, stabbing, with maximum intensity 7/10. Patient states that pain started about 24 hours ago and due to persistence  intensity increase, decide to consult ED at [facility] last night. A Ct scan was performed while at ED, showing a left ureteral calculi and perinephric stranding. Patient was admitted to receive IV antibiotics, and for pain control. Urology was consulted. Past Medical History:   Diagnosis Date    Kidney stone        Past Surgical History:   Procedure Laterality Date    HX OTHER SURGICAL      oral surgery       Family History   Problem Relation Age of Onset    Thyroid Disease Mother     Asthma Father     Asthma Brother     Cancer Maternal Aunt     Breast Cancer Maternal Grandmother        Social History     Social History    Marital status:      Spouse name: N/A    Number of children: N/A    Years of education: N/A     Social History Main Topics    Smoking status: Never Smoker    Smokeless tobacco: None    Alcohol use Yes      Comment: occ    Drug use: No    Sexual activity: Not Asked     Other Topics Concern    None     Social History Narrative       Prior to Admission medications    Medication Sig Start Date End Date Taking? Authorizing Provider   HYDROcodone-acetaminophen (NORCO) 5-325 mg per tablet Take 1-2 tablets PO every 4-6 hours as needed for pain control. If over the counter ibuprofen or acetaminophen was suggested, then only take the vicodin for pain not well controlled with the over the counter medication.  7/10/16   YOLANDA Schwab   norgestimate-ethinyl estradiol (TRI-SPRINTEC, 28,) 0.18/0.215/0.25 mg-35 mcg (28) tablet Take  by mouth. Historical Provider       Allergies   Allergen Reactions    Pcn [Penicillins] Rash and Other (comments)     Stomach cramps       Review of Systems  Constitutional: Fever: No  Skin: Rash: No  HEENT: Hearing difficulty: No  Eyes: Blurred vision: No  Cardiovascular: Chest pain: No  Respiratory: Shortness of breath: No  Gastrointestinal: Nausea/vomiting:  YES, nausea  Musculoskeletal: Back pain: YES; left  Neurological: Weakness: No  Psychological: Memory loss: No  Comments/additional findings:  No    Physical Exam:      Visit Vitals    BP 95/58 (BP 1 Location: Left arm, BP Patient Position: At rest)    Pulse 93    Temp 99.3 °F (37.4 °C)    Resp 16    Ht 5' 6\" (1.676 m)    Wt 170 lb (77.1 kg)    SpO2 99%    BMI 27.44 kg/m2     Visit Vitals    BP 95/58 (BP 1 Location: Left arm, BP Patient Position: At rest)    Pulse 93    Temp 99.3 °F (37.4 °C)    Resp 16    Ht 5' 6\" (1.676 m)    Wt 170 lb (77.1 kg)    LMP 02/25/2017    SpO2 99%    BMI 27.44 kg/m2     Constitutional: Well developed, well-nourished female in no acute distress. CV:  No peripheral swelling noted  Respiratory: No respiratory distress or difficulties  Abdomen:  Soft and nontender. No masses.  Female:  No CVA tenderness. Skin: No bruising or rashes. No petechia. Neuro/Psych:  Patient with appropriate affect. Alert and oriented. Lymphatic:   No enlargement of inguinal lymph nodes.     Labs Reviewed:  BMP:   Lab Results   Component Value Date/Time     02/25/2017 08:15 PM    K 3.5 02/25/2017 08:15 PM     02/25/2017 08:15 PM    CO2 24 02/25/2017 08:15 PM    AGAP 9 02/25/2017 08:15 PM     (H) 02/25/2017 08:15 PM    BUN 15 02/25/2017 08:15 PM    CREA 1.04 02/25/2017 08:15 PM    GFRAA >60 02/25/2017 08:15 PM    GFRNA >60 02/25/2017 08:15 PM     CBC:   Lab Results   Component Value Date/Time    WBC 11.2 02/25/2017 07:50 PM    HGB 14.2 02/25/2017 07:50 PM    HCT 42.1 02/25/2017 07:50 PM     02/25/2017 07:50 PM       Other studies reviewed:  IMPRESSION:     1. Mild left hydroureteral nephrosis secondary to several proximal left  ureteral stones. Mild left-sided perinephric and periureteric fat stranding is  nonspecific. Correlation for superimposed symptoms of urinary infection may be  helpful.     2. Additional nonobstructing bilateral renal stones as above. Assessment/Plan     Active Problems:    Renal calculi (2/26/2017)      Pyelonephritis (2/26/2017)      Ms. Merline Quivers is a 31 y.o. female with bilateral nephrolithiasis, presenting left obstructing ureteral stones and pyelonephritis. IV antibiotics started and Flomax, with symptoms control but no stone passage. The patient was counseled with options and elected to have JJ stent placement. To Or for left JJ stent  I discussed the risks of cystoscopy, left retrograde pyelogram, and left double J ureteral stent placement, including bleeding, infection, injury to bladder, ureter, kidney, need for additional procedures, inability to place the stent, voiding c/o due to stent irritation including hematuria, frequency, urgency, and dysuria. I discussed possible PCN (Percutaneous Nephrostomy Tube(s) placement if unable to pass stent past stone. All questions were answered. Pt. verbalized understanding and desires to proceed with cystoscopy, left retrograde pyelogram, and left double J stent placement today. Pt. understands She will need an additional procedure with ESWL vs ureteroscopy with holmium laser lithotripsy pending KUB. Urine culture and KUB ordered.         Chad Tripathi MD  [facility]  A Division of [facility] [place]

## 2017-02-26 NOTE — ED PROVIDER NOTES
HPI Comments:   7:01 PM   31 y.o. female presents to ED C/O flank pain, fever, nausea. Patient has a HX of renal calculi. Patient reports sudden onset of left flank pain at 1030am.  Patient has a HX of kidney stones and took home norco x 2 and zofran for nausea without improved in symptoms. patient then developed a fever of 101.8 at home which is what prompted her to come to the ED. Patient denies abdominal pain, dysuria, CP, SOB, abdominal pain. Patient reports nausea without vomiting. Patient is a nonsmoker. LMP - currently menstruating   Pt denies any other sxs or complaints. Written by Dave ARMAS      The history is provided by the patient. History limited by: No language barrier. Past Medical History:   Diagnosis Date    Kidney stone        Past Surgical History:   Procedure Laterality Date    HX OTHER SURGICAL      oral surgery         Family History:   Problem Relation Age of Onset    Thyroid Disease Mother     Asthma Father     Asthma Brother     Cancer Maternal Aunt     Breast Cancer Maternal Grandmother        Social History     Social History    Marital status:      Spouse name: N/A    Number of children: N/A    Years of education: N/A     Occupational History    Not on file. Social History Main Topics    Smoking status: Never Smoker    Smokeless tobacco: Not on file    Alcohol use Yes      Comment: occ    Drug use: No    Sexual activity: Not on file     Other Topics Concern    Not on file     Social History Narrative         ALLERGIES: Pcn [penicillins]    Review of Systems   Constitutional: Negative for chills, fatigue and fever. HENT: Negative for congestion, ear pain, facial swelling, rhinorrhea, sinus pressure, sore throat, trouble swallowing and voice change. Eyes: Negative for photophobia, pain, discharge and visual disturbance. Respiratory: Negative for cough, chest tightness, shortness of breath and wheezing.     Cardiovascular: Negative for chest pain and leg swelling. Gastrointestinal: Positive for nausea. Negative for abdominal pain, blood in stool, constipation, diarrhea and vomiting. Endocrine: Negative for polyuria. Genitourinary: Positive for flank pain. Negative for dysuria, frequency, hematuria, pelvic pain, urgency and vaginal discharge. Musculoskeletal: Negative for arthralgias, back pain, gait problem, joint swelling, neck pain and neck stiffness. Skin: Negative for rash and wound. Allergic/Immunologic: Negative for immunocompromised state. Neurological: Negative for dizziness, weakness, light-headedness, numbness and headaches. Hematological: Negative for adenopathy. Psychiatric/Behavioral: Negative for agitation, confusion, hallucinations and suicidal ideas. The patient is not nervous/anxious. Vitals:    02/26/17 0221 02/26/17 0222 02/26/17 0223 02/26/17 0225   BP:       Pulse:    94   Resp:       Temp:       SpO2: 100% 100% 100%    Weight:       Height:                Physical Exam   Constitutional: She is oriented to person, place, and time. She appears well-developed and well-nourished. Patient is tearful and appears very uncomfortable. HENT:   Head: Normocephalic and atraumatic. Right Ear: External ear normal.   Left Ear: External ear normal.   Nose: Nose normal.   Eyes: Conjunctivae and EOM are normal.   Cardiovascular: Regular rhythm. Tachycardia present. Pulmonary/Chest: Effort normal and breath sounds normal. No respiratory distress. She has no wheezes. She has no rales. Abdominal: Soft. Normal appearance. There is no tenderness. There is no rigidity, no rebound, no guarding and no CVA tenderness. Musculoskeletal:   Tenderness to left lumbar region, below CVA region. Neurological: She is alert and oriented to person, place, and time. She exhibits normal muscle tone. Coordination normal.   Skin: Skin is warm and dry. No rash noted. She is not diaphoretic. No erythema.  No pallor. Psychiatric: She has a normal mood and affect. Her behavior is normal. Judgment and thought content normal.   Nursing note and vitals reviewed. MDM  Number of Diagnoses or Management Options  Fever in adult:   Pyelonephritis:   Renal calculus, left:   Diagnosis management comments: DDX:  Cystitis, pyelonephritis, renal calculi, diverticulitis, ectopic pregnancy    MDM:  Plan- due to fever will get CT abd pelvis, CBC, CMP, UA, HCG, pain medication  Progress -  WBC is 11.2, UA - moderate blood, two numerous to count WBC. Will order urine culture and start on cipro while in department. CMP, lipase and HCG, CT pending at this time. Clarisa Chau NP 8:35 PM   9:56 PM Patient's CT currently being read, patient reports pain is starting to return, will order more pain medication. Renal function on CMP is not concerning. 10:43 PM CT abd pelvis - Findings:  1. There are two stones at the prox left ureter, largest is 9-10 mm tall (sag 56) with resultant mild to moderate hydronephrosis. Non-obstructing stones in the kidnes as well. Left greater than right perinephric edema, may be reactive, UA would be helpful to evaluate for pyelo. 2. Stomach is distended. Did the pt eat right before the exam? If not, gastroparesis could have a similar appearance. 3. No SBO. Appendix not confidently seen, possibly present on sag 66. No pericecal inflammation. 4. Mild pelvic edema and trace fluid is likely reactive and physiologic. CONSULT NOTE:   11:34 PM  I spoke with Dr Nura Gay   Specialty: urology   Discussed pt's hx, disposition, and available diagnostic and imaging results. Reviewed care plans. Consulting physician agrees with plans as outlined. Will consult on patient, may need stent in morning. Internal medicine to admit.    Written by Philip Cooks NP-C  CONSULT NOTE:   2:36 AM  I spoke with Dr Cori Araujo: Internal medicine  Discussed pt's hx, disposition, and available diagnostic and imaging results. Reviewed care plans. Consulting physician agrees with plans as outlined. Admit for further treatment and evaluation, urology to consult. Written by Damion ARMAS         Amount and/or Complexity of Data Reviewed  Clinical lab tests: ordered  Tests in the radiology section of CPT®: ordered      ED Course       Procedures             RESULTS:    CT ABD PELV WO CONT    (Results Pending)       Labs Reviewed   CBC WITH AUTOMATED DIFF - Abnormal; Notable for the following:        Result Value    NEUTROPHILS 92 (*)     LYMPHOCYTES 6 (*)     MONOCYTES 2 (*)     ABS. NEUTROPHILS 10.2 (*)     ABS.  LYMPHOCYTES 0.7 (*)     All other components within normal limits   URINALYSIS W/ RFLX MICROSCOPIC - Abnormal; Notable for the following:     Blood MODERATE (*)     Leukocyte Esterase MODERATE (*)     All other components within normal limits   URINE MICROSCOPIC ONLY - Abnormal; Notable for the following:     Bacteria 3+ (*)     All other components within normal limits   METABOLIC PANEL, COMPREHENSIVE - Abnormal; Notable for the following:     Glucose 150 (*)     Calcium 7.9 (*)     AST (SGOT) 11 (*)     Protein, total 6.1 (*)     Albumin 3.3 (*)     All other components within normal limits   CULTURE, URINE   HCG QL SERUM   LIPASE   HCG URINE, QL       Recent Results (from the past 12 hour(s))   URINALYSIS W/ RFLX MICROSCOPIC    Collection Time: 02/25/17  7:45 PM   Result Value Ref Range    Color YELLOW      Appearance CLOUDY      Specific gravity 1.023 1.005 - 1.030      pH (UA) 5.0 5.0 - 8.0      Protein NEGATIVE  NEG mg/dL    Glucose NEGATIVE  NEG mg/dL    Ketone NEGATIVE  NEG mg/dL    Bilirubin NEGATIVE  NEG      Blood MODERATE (A) NEG      Urobilinogen 0.2 0.2 - 1.0 EU/dL    Nitrites NEGATIVE  NEG      Leukocyte Esterase MODERATE (A) NEG     URINE MICROSCOPIC ONLY    Collection Time: 02/25/17  7:45 PM   Result Value Ref Range    WBC TOO NUMEROUS TO COUNT 0 - 4 /hpf    RBC 4 to 10 0 - 5 /hpf    Epithelial cells 2+ 0 - 5 /lpf    Bacteria 3+ (A) NEG /hpf   CBC WITH AUTOMATED DIFF    Collection Time: 02/25/17  7:50 PM   Result Value Ref Range    WBC 11.2 4.6 - 13.2 K/uL    RBC 4.78 4.20 - 5.30 M/uL    HGB 14.2 12.0 - 16.0 g/dL    HCT 42.1 35.0 - 45.0 %    MCV 88.1 74.0 - 97.0 FL    MCH 29.7 24.0 - 34.0 PG    MCHC 33.7 31.0 - 37.0 g/dL    RDW 13.2 11.6 - 14.5 %    PLATELET 395 542 - 636 K/uL    MPV 11.5 9.2 - 11.8 FL    NEUTROPHILS 92 (H) 40 - 73 %    LYMPHOCYTES 6 (L) 21 - 52 %    MONOCYTES 2 (L) 3 - 10 %    EOSINOPHILS 0 0 - 5 %    BASOPHILS 0 0 - 2 %    ABS. NEUTROPHILS 10.2 (H) 1.8 - 8.0 K/UL    ABS. LYMPHOCYTES 0.7 (L) 0.9 - 3.6 K/UL    ABS. MONOCYTES 0.3 0.05 - 1.2 K/UL    ABS. EOSINOPHILS 0.0 0.0 - 0.4 K/UL    ABS. BASOPHILS 0.0 0.0 - 0.06 K/UL    DF AUTOMATED     HCG QL SERUM    Collection Time: 02/25/17  7:50 PM   Result Value Ref Range    HCG, Ql. NEGATIVE  NEG     LIPASE    Collection Time: 02/25/17  8:15 PM   Result Value Ref Range    Lipase 96 73 - 192 U/L   METABOLIC PANEL, COMPREHENSIVE    Collection Time: 02/25/17  8:15 PM   Result Value Ref Range    Sodium 137 136 - 145 mmol/L    Potassium 3.5 3.5 - 5.5 mmol/L    Chloride 104 100 - 108 mmol/L    CO2 24 21 - 32 mmol/L    Anion gap 9 3.0 - 18 mmol/L    Glucose 150 (H) 74 - 99 mg/dL    BUN 15 7.0 - 18 MG/DL    Creatinine 1.04 0.6 - 1.3 MG/DL    BUN/Creatinine ratio 14 12 - 20      GFR est AA >60 >60 ml/min/1.73m2    GFR est non-AA >60 >60 ml/min/1.73m2    Calcium 7.9 (L) 8.5 - 10.1 MG/DL    Bilirubin, total 0.6 0.2 - 1.0 MG/DL    ALT (SGPT) 18 13 - 56 U/L    AST (SGOT) 11 (L) 15 - 37 U/L    Alk. phosphatase 63 45 - 117 U/L    Protein, total 6.1 (L) 6.4 - 8.2 g/dL    Albumin 3.3 (L) 3.4 - 5.0 g/dL    Globulin 2.8 2.0 - 4.0 g/dL    A-G Ratio 1.2 0.8 - 1.7         PROGRESS NOTE:   7:01 PM   Initial assessment completed. Written by Dov Tan [email or web address]     Disposition:  Admitted, inpatient unit, urology to consult.   Christopher Aragon NP 2:37 AM     CLINICAL IMPRESSION:    1. Renal calculus, left    2. Pyelonephritis    3. Fever in adult        AFTER VISIT PLAN:    Current Discharge Medication List      CONTINUE these medications which have NOT CHANGED    Details   HYDROcodone-acetaminophen (NORCO) 5-325 mg per tablet Take 1-2 tablets PO every 4-6 hours as needed for pain control. If over the counter ibuprofen or acetaminophen was suggested, then only take the vicodin for pain not well controlled with the over the counter medication. Qty: 5 Tab, Refills: 0      norgestimate-ethinyl estradiol (TRI-SPRINTEC, 28,) 0.18/0.215/0.25 mg-35 mcg (28) tablet Take  by mouth.               Follow-up Information     None           Written by Jonathon ARMAS

## 2017-02-27 LAB
ANION GAP BLD CALC-SCNC: 9 MMOL/L (ref 3–18)
ATRIAL RATE: 115 BPM
BASOPHILS # BLD AUTO: 0 K/UL (ref 0–0.06)
BASOPHILS # BLD: 0 % (ref 0–2)
BUN SERPL-MCNC: 11 MG/DL (ref 7–18)
BUN/CREAT SERPL: 11 (ref 12–20)
CALCIUM SERPL-MCNC: 7.6 MG/DL (ref 8.5–10.1)
CALCULATED P AXIS, ECG09: 55 DEGREES
CALCULATED R AXIS, ECG10: 60 DEGREES
CALCULATED T AXIS, ECG11: 43 DEGREES
CHLORIDE SERPL-SCNC: 110 MMOL/L (ref 100–108)
CO2 SERPL-SCNC: 20 MMOL/L (ref 21–32)
CREAT SERPL-MCNC: 1.01 MG/DL (ref 0.6–1.3)
DIAGNOSIS, 93000: NORMAL
DIFFERENTIAL METHOD BLD: ABNORMAL
EOSINOPHIL # BLD: 0 K/UL (ref 0–0.4)
EOSINOPHIL NFR BLD: 0 % (ref 0–5)
ERYTHROCYTE [DISTWIDTH] IN BLOOD BY AUTOMATED COUNT: 14 % (ref 11.6–14.5)
GLUCOSE SERPL-MCNC: 103 MG/DL (ref 74–99)
HCT VFR BLD AUTO: 35.4 % (ref 35–45)
HGB BLD-MCNC: 11.3 G/DL (ref 12–16)
LACTATE SERPL-SCNC: 0.7 MMOL/L (ref 0.4–2)
LYMPHOCYTES # BLD AUTO: 4 % (ref 21–52)
LYMPHOCYTES # BLD: 0.3 K/UL (ref 0.9–3.6)
MCH RBC QN AUTO: 29 PG (ref 24–34)
MCHC RBC AUTO-ENTMCNC: 31.9 G/DL (ref 31–37)
MCV RBC AUTO: 91 FL (ref 74–97)
MONOCYTES # BLD: 0.4 K/UL (ref 0.05–1.2)
MONOCYTES NFR BLD AUTO: 5 % (ref 3–10)
NEUTS SEG # BLD: 7.5 K/UL (ref 1.8–8)
NEUTS SEG NFR BLD AUTO: 91 % (ref 40–73)
P-R INTERVAL, ECG05: 142 MS
PLATELET # BLD AUTO: 124 K/UL (ref 135–420)
PMV BLD AUTO: 10.9 FL (ref 9.2–11.8)
POTASSIUM SERPL-SCNC: 4.4 MMOL/L (ref 3.5–5.5)
Q-T INTERVAL, ECG07: 326 MS
QRS DURATION, ECG06: 92 MS
QTC CALCULATION (BEZET), ECG08: 450 MS
RBC # BLD AUTO: 3.89 M/UL (ref 4.2–5.3)
SODIUM SERPL-SCNC: 139 MMOL/L (ref 136–145)
VENTRICULAR RATE, ECG03: 115 BPM
WBC # BLD AUTO: 8.3 K/UL (ref 4.6–13.2)

## 2017-02-27 PROCEDURE — 36415 COLL VENOUS BLD VENIPUNCTURE: CPT | Performed by: HOSPITALIST

## 2017-02-27 PROCEDURE — 65270000029 HC RM PRIVATE

## 2017-02-27 PROCEDURE — 74011250637 HC RX REV CODE- 250/637: Performed by: HOSPITALIST

## 2017-02-27 PROCEDURE — 74011250636 HC RX REV CODE- 250/636: Performed by: HOSPITALIST

## 2017-02-27 PROCEDURE — 85025 COMPLETE CBC W/AUTO DIFF WBC: CPT | Performed by: HOSPITALIST

## 2017-02-27 PROCEDURE — 74011250636 HC RX REV CODE- 250/636: Performed by: NURSE PRACTITIONER

## 2017-02-27 PROCEDURE — 74011250636 HC RX REV CODE- 250/636: Performed by: FAMILY MEDICINE

## 2017-02-27 PROCEDURE — 83605 ASSAY OF LACTIC ACID: CPT | Performed by: UROLOGY

## 2017-02-27 PROCEDURE — 77030027138 HC INCENT SPIROMETER -A

## 2017-02-27 PROCEDURE — 80048 BASIC METABOLIC PNL TOTAL CA: CPT | Performed by: HOSPITALIST

## 2017-02-27 RX ORDER — HYDROMORPHONE HYDROCHLORIDE 1 MG/ML
1 INJECTION, SOLUTION INTRAMUSCULAR; INTRAVENOUS; SUBCUTANEOUS
Status: DISCONTINUED | OUTPATIENT
Start: 2017-02-27 | End: 2017-02-28

## 2017-02-27 RX ORDER — ONDANSETRON 2 MG/ML
4 INJECTION INTRAMUSCULAR; INTRAVENOUS
Status: DISCONTINUED | OUTPATIENT
Start: 2017-02-27 | End: 2017-03-01

## 2017-02-27 RX ORDER — PHENAZOPYRIDINE HYDROCHLORIDE 100 MG/1
200 TABLET, FILM COATED ORAL
Status: COMPLETED | OUTPATIENT
Start: 2017-02-27 | End: 2017-03-01

## 2017-02-27 RX ADMIN — HYDROMORPHONE HYDROCHLORIDE 0.5 MG: 1 INJECTION, SOLUTION INTRAMUSCULAR; INTRAVENOUS; SUBCUTANEOUS at 16:59

## 2017-02-27 RX ADMIN — HYDROMORPHONE HYDROCHLORIDE 0.5 MG: 1 INJECTION, SOLUTION INTRAMUSCULAR; INTRAVENOUS; SUBCUTANEOUS at 05:17

## 2017-02-27 RX ADMIN — HYDROMORPHONE HYDROCHLORIDE 0.5 MG: 1 INJECTION, SOLUTION INTRAMUSCULAR; INTRAVENOUS; SUBCUTANEOUS at 10:00

## 2017-02-27 RX ADMIN — HYDROMORPHONE HYDROCHLORIDE 1 MG: 1 INJECTION, SOLUTION INTRAMUSCULAR; INTRAVENOUS; SUBCUTANEOUS at 22:22

## 2017-02-27 RX ADMIN — LEVOFLOXACIN 750 MG: 5 INJECTION, SOLUTION INTRAVENOUS at 18:26

## 2017-02-27 RX ADMIN — SODIUM CHLORIDE 150 ML/HR: 900 INJECTION, SOLUTION INTRAVENOUS at 22:23

## 2017-02-27 RX ADMIN — HYDROMORPHONE HYDROCHLORIDE 0.5 MG: 1 INJECTION, SOLUTION INTRAMUSCULAR; INTRAVENOUS; SUBCUTANEOUS at 13:52

## 2017-02-27 RX ADMIN — ACETAMINOPHEN 650 MG: 325 TABLET, FILM COATED ORAL at 22:31

## 2017-02-27 RX ADMIN — HYDROMORPHONE HYDROCHLORIDE 0.5 MG: 1 INJECTION, SOLUTION INTRAMUSCULAR; INTRAVENOUS; SUBCUTANEOUS at 20:12

## 2017-02-27 RX ADMIN — SODIUM CHLORIDE 150 ML/HR: 900 INJECTION, SOLUTION INTRAVENOUS at 10:41

## 2017-02-27 NOTE — PROGRESS NOTES
Assumed care of alert and oriented female pt. Pt family at bedside. States flank pain in 5/10 after medication. SR on monitor HR 98.

## 2017-02-27 NOTE — PROGRESS NOTES
Progress Note    Patient: Kalina Mckenzie MRN: [id number]  SSN: [id number]    YOB: 1990  Age: 31 y.o. Sex: female      Admit Date: 2/25/2017    LOS: 1 day     Subjective:     No events, no flank pain, no fever overnight    Objective:     Vitals:    02/26/17 2223 02/26/17 2321 02/27/17 0206 02/27/17 0659   BP: 114/46  111/68 99/61   Pulse:   96 82   Resp:   20 20   Temp:   98.3 °F (36.8 °C) 98.2 °F (36.8 °C)   SpO2: 94%  97% 96%   Weight:  169 lb 12.1 oz (77 kg)     Height:            Intake and Output:  Current Shift:    Last three shifts: 02/25 1901 - 02/27 0700  In: 900 [I.V.:900]  Out: 725 [Urine:725]    Physical Exam:   GENERAL: alert, cooperative, no distress, appears stated age  LUNG: unlabored  HEART: regular rate and rhythm  ABDOMEN: Soft, Non tender  EXTREMITIES:  extremities normal, atraumatic, no cyanosis or edema  SKIN: Normal.  PSYCHIATRIC: non focal  Bone: Clear    Lab/Data Review:    Lab Results   Component Value Date/Time    WBC 8.3 02/27/2017 04:15 AM    HGB 11.3 02/27/2017 04:15 AM    HCT 35.4 02/27/2017 04:15 AM    PLATELET 635 36/02/4307 04:15 AM    MCV 91.0 02/27/2017 04:15 AM       Lab Results   Component Value Date/Time    Sodium 139 02/27/2017 04:15 AM    Potassium 4.4 02/27/2017 04:15 AM    Chloride 110 02/27/2017 04:15 AM    CO2 20 02/27/2017 04:15 AM    Anion gap 9 02/27/2017 04:15 AM    Glucose 103 02/27/2017 04:15 AM    BUN 11 02/27/2017 04:15 AM    Creatinine 1.01 02/27/2017 04:15 AM    BUN/Creatinine ratio 11 02/27/2017 04:15 AM    GFR est AA >60 02/27/2017 04:15 AM    GFR est non-AA >60 02/27/2017 04:15 AM    Calcium 7.6 02/27/2017 04:15 AM       POD 1 sp L ureteral stent with pus on aspiration of collecting system    Assessment:     Active Problems:    Renal calculi (2/26/2017)      Pyelonephritis (2/26/2017)        Plan:      Bone out  F/u U Cx, B Cx  Con't abx  Home once cultures finalize  Follow up arranged    Signed By: Alyssa Dutta MD     February 27, 2017

## 2017-02-27 NOTE — PROGRESS NOTES
Pt resting comfortably in bed, family present at bedside. Pt states she has voided several times since rutledge was removed. Urine is clear yellow with small amount of blood.

## 2017-02-27 NOTE — PROGRESS NOTES
conducted an initial consultation and Spiritual Assessment for Cee Clemons, who is a 31 y.o.,female. Patients Primary Language is: [place]. According to the patients EMR Latter-day Affiliation is: No Confucianism. The reason the Patient came to the hospital is:   Patient Active Problem List    Diagnosis Date Noted    Renal calculi 02/26/2017    Pyelonephritis 02/26/2017        The  provided the following Interventions:  Initiated a relationship of care and support. Explored issues of dalton, belief, spirituality and Yarsanism/ritual needs while hospitalized. Listened empathically. Provided chaplaincy education. Provided information about Spiritual Care Services. Offered prayer and assurance of continued prayers on patient's behalf. Chart reviewed. The following outcomes were achieved:  Patient shared limited information about both their medical narrative and spiritual journey/beliefs. Patient processed feeling about current hospitalization. Patient expressed gratitude for the 's visit. Assessment:  Patient does not have any Yarsanism/cultural needs that will affect patients preferences in health care. Patient did not indicate any spiritual or Yarsanism issues which require Spiritual Care Services interventions at this time. Plan:  Chaplains will continue to follow and will provide pastoral care on an as needed/requested basis.  recommends bedside caregivers page  on duty if patient shows signs of acute spiritual or emotional distress.     [address] [facility]   Staff [address] [facility]   [phone]

## 2017-02-27 NOTE — ANESTHESIA POSTPROCEDURE EVALUATION
Post-Anesthesia Evaluation and Assessment    Patient: Shaylee Velasco MRN: [id number]  SSN: [id number]    YOB: 1990  Age: 31 y.o. Sex: female       Cardiovascular Function/Vital Signs  Visit Vitals    BP 99/56    Pulse (!) 118    Temp 37.3 °C (99.2 °F)    Resp 22    Ht 5' 6\" (1.676 m)    Wt 77.1 kg (170 lb)    SpO2 (P) 95%    BMI 27.44 kg/m2       Patient is status post general anesthesia for Procedure(s):  CYSTOSCOPY URETERAL STENT INSERTION OR REMOVAL. Nausea/Vomiting: None    Postoperative hydration reviewed and adequate. Pain:  Pain Scale 1: (P) Numeric (0 - 10) (02/26/17 2209)  Pain Intensity 1: 0 (02/26/17 2159)   Managed    Neurological Status:   Neuro (WDL): (P) Within Defined Limits (02/26/17 2209)   At baseline    Mental Status and Level of Consciousness: Alert and oriented     Pulmonary Status:   O2 Device: (P) Room air (02/26/17 2209)   Adequate oxygenation and airway patent    Complications related to anesthesia: None    Post-anesthesia assessment completed.  No concerns    Signed By: Kirk Arvizu MD     February 26, 2017

## 2017-02-27 NOTE — PROGRESS NOTES
-- Bedside and Verbal shift change report given to Justin Whitt RN (oncoming nurse) by Nikolas Tafoya RN (offgoing nurse). Report included the following information SBAR, Procedure Summary, Intake/Output, MAR, Recent Results. Bed locked and lowered, siderails up x3. Whiteboard updated with information. Call bell at bedside, will continue to monitor. -- Morning medications administered, pt tolerated well. 1044--- removed rutledge      -- Shift reassessment, pt condition unchanged will continue to monitor.      -- Shift reassessment, pt condition unchanged will continue to monitor.     0700-- Bedside and Verbal shift change report given to Dary  RN (oncoming nurse) by Melody Calloway RN (offgoing nurse) Report included the following information SBAR, Procedure Summary, Intake/Output, MAR, Recent Results. Pt up in bed, no indication of pain or acute distress. Bed locked and lowered, siderails up x3. Call bell at bedside.

## 2017-02-27 NOTE — PROGRESS NOTES
[facility]  Hospitalist Division        Inpatient Daily Progress Note    Daily progress Note    Patient: Paz Garcia MRN: [id number]  CSN: [id number]    YOB: 1990  Age: 31 y.o. Sex: female    DOA: 2/25/2017 LOS:  LOS: 1 day                    Chief Complaint:  Left flank pain       Subjective:      Fever overnight. Denies nausea or vomiting. Significant other at bedside. In NAD. Objective:      Visit Vitals    /72 (BP 1 Location: Left arm, BP Patient Position: At rest;Supine)    Pulse 72    Temp 98.8 °F (37.1 °C)    Resp 18    Ht 5' 6\" (1.676 m)    Wt 77 kg (169 lb 12.1 oz)    SpO2 93%    BMI 27.4 kg/m2       Physical Exam:  General appearance: alert and oriented, cooperative, no distress, appears stated age  Head: Normocephalic, without obvious abnormality, atraumatic  Lungs: clear to auscultation throughout   Heart: regular rate and rhythm, S1, S2 normal, no murmur, click, rub or gallop  Abdomen: soft, non tender, non distended. Left CVA tenderness.  Normoactive bowel sounds   Extremities: extremities normal, atraumatic, no cyanosis or edema  Skin: Skin color, texture, turgor normal. No rashes or lesions  Neurologic: Grossly normal  PSY: Mood and affect normal, appropriately behaved        Intake and Output:  Current Shift:  02/27 0701 - 02/27 1900  In: 240 [P.O.:240]  Out: -   Last three shifts:  02/25 1901 - 02/27 0700  In: 900 [I.V.:900]  Out: 725 [Urine:725]    Recent Results (from the past 24 hour(s))   LACTIC ACID, PLASMA    Collection Time: 02/26/17 10:35 PM   Result Value Ref Range    Lactic acid 0.8 0.4 - 2.0 MMOL/L   CULTURE, BLOOD    Collection Time: 02/26/17 10:35 PM   Result Value Ref Range    Special Requests: NO SPECIAL REQUESTS      Culture result: NO GROWTH AFTER 7 HOURS     METABOLIC PANEL, COMPREHENSIVE    Collection Time: 02/26/17 10:35 PM   Result Value Ref Range    Sodium 141 136 - 145 mmol/L    Potassium 3.4 (L) 3.5 - 5.5 mmol/L    Chloride 113 (H) 100 - 108 mmol/L    CO2 19 (L) 21 - 32 mmol/L    Anion gap 9 3.0 - 18 mmol/L    Glucose 90 74 - 99 mg/dL    BUN 11 7.0 - 18 MG/DL    Creatinine 1.17 0.6 - 1.3 MG/DL    BUN/Creatinine ratio 9 (L) 12 - 20      GFR est AA >60 >60 ml/min/1.73m2    GFR est non-AA 55 (L) >60 ml/min/1.73m2    Calcium 6.8 (L) 8.5 - 10.1 MG/DL    Bilirubin, total 0.6 0.2 - 1.0 MG/DL    ALT (SGPT) 12 (L) 13 - 56 U/L    AST (SGOT) 12 (L) 15 - 37 U/L    Alk. phosphatase 52 45 - 117 U/L    Protein, total 4.9 (L) 6.4 - 8.2 g/dL    Albumin 2.6 (L) 3.4 - 5.0 g/dL    Globulin 2.3 2.0 - 4.0 g/dL    A-G Ratio 1.1 0.8 - 1.7     CBC WITH AUTOMATED DIFF    Collection Time: 02/26/17 10:35 PM   Result Value Ref Range    WBC 10.4 4.6 - 13.2 K/uL    RBC 3.99 (L) 4.20 - 5.30 M/uL    HGB 11.6 (L) 12.0 - 16.0 g/dL    HCT 36.0 35.0 - 45.0 %    MCV 90.2 74.0 - 97.0 FL    MCH 29.1 24.0 - 34.0 PG    MCHC 32.2 31.0 - 37.0 g/dL    RDW 13.7 11.6 - 14.5 %    PLATELET 366 (L) 713 - 420 K/uL    MPV 10.5 9.2 - 11.8 FL    NEUTROPHILS 84 (H) 42 - 75 %    BAND NEUTROPHILS 9 (H) 0 - 5 %    LYMPHOCYTES 3 (L) 20 - 51 %    MONOCYTES 4 2 - 9 %    EOSINOPHILS 0 0 - 5 %    BASOPHILS 0 0 - 3 %    ABS. NEUTROPHILS 8.7 (H) 1.8 - 8.0 K/UL    ABS. LYMPHOCYTES 0.3 (L) 0.8 - 3.5 K/UL    ABS. MONOCYTES 0.4 0 - 1.0 K/UL    ABS. EOSINOPHILS 0.0 0.0 - 0.4 K/UL    ABS.  BASOPHILS 0.0 0.0 - 0.1 K/UL    RBC COMMENTS NORMOCYTIC, NORMOCHROMIC      DF MANUAL     CULTURE, BLOOD    Collection Time: 02/26/17 10:40 PM   Result Value Ref Range    Special Requests: NO SPECIAL REQUESTS      Culture result: NO GROWTH AFTER 7 HOURS     EKG, 12 LEAD, INITIAL    Collection Time: 02/26/17 10:41 PM   Result Value Ref Range    Ventricular Rate 115 BPM    Atrial Rate 115 BPM    P-R Interval 142 ms    QRS Duration 92 ms    Q-T Interval 326 ms    QTC Calculation (Bezet) 450 ms    Calculated P Axis 55 degrees    Calculated R Axis 60 degrees    Calculated T Axis 43 degrees    Diagnosis Sinus tachycardia  Incomplete right bundle branch block  Borderline ECG  No previous ECGs available  Confirmed by Javier Root ([id number]) on 2/27/2017 10:38:51 AM     CBC WITH AUTOMATED DIFF    Collection Time: 02/27/17  4:15 AM   Result Value Ref Range    WBC 8.3 4.6 - 13.2 K/uL    RBC 3.89 (L) 4.20 - 5.30 M/uL    HGB 11.3 (L) 12.0 - 16.0 g/dL    HCT 35.4 35.0 - 45.0 %    MCV 91.0 74.0 - 97.0 FL    MCH 29.0 24.0 - 34.0 PG    MCHC 31.9 31.0 - 37.0 g/dL    RDW 14.0 11.6 - 14.5 %    PLATELET 273 (L) 967 - 420 K/uL    MPV 10.9 9.2 - 11.8 FL    NEUTROPHILS 91 (H) 40 - 73 %    LYMPHOCYTES 4 (L) 21 - 52 %    MONOCYTES 5 3 - 10 %    EOSINOPHILS 0 0 - 5 %    BASOPHILS 0 0 - 2 %    ABS. NEUTROPHILS 7.5 1.8 - 8.0 K/UL    ABS. LYMPHOCYTES 0.3 (L) 0.9 - 3.6 K/UL    ABS. MONOCYTES 0.4 0.05 - 1.2 K/UL    ABS. EOSINOPHILS 0.0 0.0 - 0.4 K/UL    ABS. BASOPHILS 0.0 0.0 - 0.06 K/UL    DF AUTOMATED     METABOLIC PANEL, BASIC    Collection Time: 02/27/17  4:15 AM   Result Value Ref Range    Sodium 139 136 - 145 mmol/L    Potassium 4.4 3.5 - 5.5 mmol/L    Chloride 110 (H) 100 - 108 mmol/L    CO2 20 (L) 21 - 32 mmol/L    Anion gap 9 3.0 - 18 mmol/L    Glucose 103 (H) 74 - 99 mg/dL    BUN 11 7.0 - 18 MG/DL    Creatinine 1.01 0.6 - 1.3 MG/DL    BUN/Creatinine ratio 11 (L) 12 - 20      GFR est AA >60 >60 ml/min/1.73m2    GFR est non-AA >60 >60 ml/min/1.73m2    Calcium 7.6 (L) 8.5 - 10.1 MG/DL   LACTIC ACID, PLASMA    Collection Time: 02/27/17  4:15 AM   Result Value Ref Range    Lactic acid 0.7 0.4 - 2.0 MMOL/L           Lab Results   Component Value Date/Time    Glucose 103 02/27/2017 04:15 AM    Glucose 90 02/26/2017 10:35 PM    Glucose 150 02/25/2017 08:15 PM        Assessment/Plan:     Patient Active Problem List   Diagnosis Code    Renal calculi N20.0    Pyelonephritis N12       A/P:  Pyelonephritis: continue IV Levaquin. Follow urine culture.  Monitor temperature curve   Pain management: add Percocet to transition to PO   PRN antiemetics   DVT prophylaxis: SCDs      Hanny Morales, FNP-BC  [facility]  Pager:  [phone]  Office:  [phone]    I examined the patient , reviewed the history, labs, and radiology reports  independently. I have reviewed the NP documentation, agree with the documentation, medical decision making and treatment plan as outlined by my NP.     Fatou Schuler MD

## 2017-02-27 NOTE — PERIOP NOTES
TRANSFER - OUT REPORT:    Verbal report given to spenser hinkle(name) on Abbe Peers  being transferred to [facility](unit) for routine post - op       Report consisted of patients Situation, Background, Assessment and   Recommendations(SBAR). Information from the following report(s) SBAR, OR Summary, Procedure Summary and MAR was reviewed with the receiving nurse. Lines:   Peripheral IV 02/25/17 Right Hand (Active)   Site Assessment Clean, dry, & intact 2/26/2017 10:00 PM   Phlebitis Assessment 0 2/26/2017 10:00 PM   Infiltration Assessment 0 2/26/2017 10:00 PM   Dressing Status Clean, dry, & intact 2/26/2017 10:00 PM   Dressing Type Transparent;Tape 2/26/2017 10:00 PM   Hub Color/Line Status Infusing;Green 2/26/2017 10:00 PM   Action Taken Blood drawn 2/25/2017  7:50 PM   Alcohol Cap Used Yes 2/26/2017  4:01 PM       Peripheral IV Left Hand (Active)        Opportunity for questions and clarification was provided.       Patient transported with:   saambaa

## 2017-02-27 NOTE — ROUTINE PROCESS
Bedside shift change report given to [facility] (oncoming nurse) by Lenin Stubbs RN (offgoing nurse). Report included the following information SBAR, Intake/Output, MAR and Cardiac Rhythm ST to NSR.

## 2017-02-27 NOTE — PROCEDURES
Operative Note    2/26/2017    Patient: Isabel Russo               Sex: female             MRN: [id number]      YOB: 1990      Age:  31 y.o. Preoperative Diagnosis: stent    Postoperative Diagnosis:  * No post-op diagnosis entered *    Surgeon: Surgeon(s) and Role:     * Renee Moctezuma MD - Primary    Assistant: none    Anesthesia:  General    Indications for surgery:     Procedure:  Procedure(s):  CYSTOSCOPY URETERAL STENT INSERTION OR REMOVAL     Procedure in Detail:   The patient was seen in the pre-operative area. The risks, benefits, complications, alternative treatment options, and expected outcomes were again discussed with the patient's parents. The possibilities of reaction to medication, pain, infection, bleeding, major cardiovascular event, death, damage to surrounding structures were specifically addressed. Informed consent was then obtained. The site of surgery properly noted/marked (left). The patient was brought to the cystoscopy suite. Under adequate LMA anesthesia, the patient was positioned in dorsal lithotomy and prepped and draped as usual  A preoperative time out was performed addressing the anticipated surgical site, procedure, and safety precautions. Sequential compression stockings were applied. Prophylactic antibiotic (Levaquin and vancomycin) was administered empirically. The 21Fr cystoscope was inserted through a normal urethra. A brief inspection of the bladder surface revealed no mucosal abnormalities. Yellow jacket was advanced in the left ureter, and then  A 0.038\" glide wire was guided into the left ureter to the renal pelvis. Yellow jacket was advanced proximal to the pelvis. Pus was aspirated from the kidney and sent for culture. A retrograde pyelogram was performed, using contrast mixed with Gentamycin. Wire was passed to the upper pole. Yellow jacket removed. 6x26 JJ stent was advanced over wire.   Wire removed and stent deployed in good position. XRay saved confirming good stent position. Next a 16 fr rutledge catheter was placed without resistance, and the Bladder was drained. Scope removed. Pt awakened and transferred to recovery. No complications. Pt tolerated the procedure well. Estimated Blood Loss:  None                  Implants: 6 Fr X 26 Cm ureteral stent, no string    Specimens:   ID Type Source Tests Collected by Time Destination   1 : URINE FROM LEFT KIDNEY Urine Kidney CULTURE, ANAEROBIC, CULTURE, URINE, CULTURE & SMEAR, [employer] Sidra Beltran MD 2/26/2017  9:39 PM Microbiology        Drains: JJ stent, 6 fr X 26 cm; Rutledge catheter, 16 Fr           Complications:  None           Counts: Sponge and needle counts were correct times two.     Plan: Ms. Rg Wilhelm has an appointment to see me in one week  for early postop follow-up, at [address] [place] [address]. Tushar Beauchamp, [address] [facility]    Sidra Beltran MD  2/26/2017

## 2017-02-28 LAB
ANION GAP BLD CALC-SCNC: 7 MMOL/L (ref 3–18)
BACTERIA SPEC CULT: ABNORMAL
BASOPHILS # BLD AUTO: 0 K/UL (ref 0–0.06)
BASOPHILS # BLD: 0 % (ref 0–2)
BUN SERPL-MCNC: 7 MG/DL (ref 7–18)
BUN/CREAT SERPL: 10 (ref 12–20)
CALCIUM SERPL-MCNC: 7.4 MG/DL (ref 8.5–10.1)
CHLORIDE SERPL-SCNC: 112 MMOL/L (ref 100–108)
CO2 SERPL-SCNC: 23 MMOL/L (ref 21–32)
CREAT SERPL-MCNC: 0.72 MG/DL (ref 0.6–1.3)
DIFFERENTIAL METHOD BLD: ABNORMAL
EOSINOPHIL # BLD: 0.1 K/UL (ref 0–0.4)
EOSINOPHIL NFR BLD: 1 % (ref 0–5)
ERYTHROCYTE [DISTWIDTH] IN BLOOD BY AUTOMATED COUNT: 14 % (ref 11.6–14.5)
GLUCOSE SERPL-MCNC: 103 MG/DL (ref 74–99)
HCT VFR BLD AUTO: 35.1 % (ref 35–45)
HGB BLD-MCNC: 11.2 G/DL (ref 12–16)
LYMPHOCYTES # BLD AUTO: 14 % (ref 21–52)
LYMPHOCYTES # BLD: 0.8 K/UL (ref 0.9–3.6)
MCH RBC QN AUTO: 28.8 PG (ref 24–34)
MCHC RBC AUTO-ENTMCNC: 31.9 G/DL (ref 31–37)
MCV RBC AUTO: 90.2 FL (ref 74–97)
MONOCYTES # BLD: 0.4 K/UL (ref 0.05–1.2)
MONOCYTES NFR BLD AUTO: 7 % (ref 3–10)
NEUTS SEG # BLD: 4.2 K/UL (ref 1.8–8)
NEUTS SEG NFR BLD AUTO: 78 % (ref 40–73)
PLATELET # BLD AUTO: 127 K/UL (ref 135–420)
PMV BLD AUTO: 11.3 FL (ref 9.2–11.8)
POTASSIUM SERPL-SCNC: 4 MMOL/L (ref 3.5–5.5)
RBC # BLD AUTO: 3.89 M/UL (ref 4.2–5.3)
SERVICE CMNT-IMP: ABNORMAL
SODIUM SERPL-SCNC: 142 MMOL/L (ref 136–145)
WBC # BLD AUTO: 5.5 K/UL (ref 4.6–13.2)

## 2017-02-28 PROCEDURE — 65270000029 HC RM PRIVATE

## 2017-02-28 PROCEDURE — 74011250636 HC RX REV CODE- 250/636: Performed by: NURSE PRACTITIONER

## 2017-02-28 PROCEDURE — 36415 COLL VENOUS BLD VENIPUNCTURE: CPT | Performed by: NURSE PRACTITIONER

## 2017-02-28 PROCEDURE — 74011250636 HC RX REV CODE- 250/636: Performed by: FAMILY MEDICINE

## 2017-02-28 PROCEDURE — 74011250636 HC RX REV CODE- 250/636: Performed by: HOSPITALIST

## 2017-02-28 PROCEDURE — 74011250637 HC RX REV CODE- 250/637: Performed by: FAMILY MEDICINE

## 2017-02-28 PROCEDURE — 80048 BASIC METABOLIC PNL TOTAL CA: CPT | Performed by: NURSE PRACTITIONER

## 2017-02-28 PROCEDURE — 74011250637 HC RX REV CODE- 250/637: Performed by: NURSE PRACTITIONER

## 2017-02-28 PROCEDURE — 74011250637 HC RX REV CODE- 250/637: Performed by: UROLOGY

## 2017-02-28 PROCEDURE — 77030020263 HC SOL INJ SOD CL0.9% LFCR 1000ML

## 2017-02-28 PROCEDURE — 85025 COMPLETE CBC W/AUTO DIFF WBC: CPT | Performed by: NURSE PRACTITIONER

## 2017-02-28 PROCEDURE — 74011250637 HC RX REV CODE- 250/637: Performed by: HOSPITALIST

## 2017-02-28 RX ORDER — OXYBUTYNIN CHLORIDE 5 MG/1
5 TABLET ORAL EVERY 8 HOURS
Status: DISCONTINUED | OUTPATIENT
Start: 2017-02-28 | End: 2017-03-02 | Stop reason: HOSPADM

## 2017-02-28 RX ORDER — OXYCODONE AND ACETAMINOPHEN 5; 325 MG/1; MG/1
1-2 TABLET ORAL
Status: DISCONTINUED | OUTPATIENT
Start: 2017-02-28 | End: 2017-03-02 | Stop reason: HOSPADM

## 2017-02-28 RX ORDER — TAMSULOSIN HYDROCHLORIDE 0.4 MG/1
0.4 CAPSULE ORAL EVERY EVENING
Status: DISCONTINUED | OUTPATIENT
Start: 2017-02-28 | End: 2017-03-02 | Stop reason: HOSPADM

## 2017-02-28 RX ORDER — HYDROMORPHONE HYDROCHLORIDE 1 MG/ML
0.5 INJECTION, SOLUTION INTRAMUSCULAR; INTRAVENOUS; SUBCUTANEOUS
Status: DISCONTINUED | OUTPATIENT
Start: 2017-02-28 | End: 2017-03-02

## 2017-02-28 RX ADMIN — HYDROMORPHONE HYDROCHLORIDE 0.5 MG: 1 INJECTION, SOLUTION INTRAMUSCULAR; INTRAVENOUS; SUBCUTANEOUS at 21:04

## 2017-02-28 RX ADMIN — Medication 10 ML: at 03:21

## 2017-02-28 RX ADMIN — HYDROMORPHONE HYDROCHLORIDE 1 MG: 1 INJECTION, SOLUTION INTRAMUSCULAR; INTRAVENOUS; SUBCUTANEOUS at 11:30

## 2017-02-28 RX ADMIN — PHENAZOPYRIDINE 200 MG: 100 TABLET ORAL at 18:27

## 2017-02-28 RX ADMIN — SODIUM CHLORIDE 150 ML/HR: 900 INJECTION, SOLUTION INTRAVENOUS at 17:07

## 2017-02-28 RX ADMIN — OXYCODONE HYDROCHLORIDE AND ACETAMINOPHEN 1 TABLET: 5; 325 TABLET ORAL at 18:27

## 2017-02-28 RX ADMIN — ONDANSETRON 4 MG: 2 INJECTION INTRAMUSCULAR; INTRAVENOUS at 08:46

## 2017-02-28 RX ADMIN — SODIUM CHLORIDE 150 ML/HR: 900 INJECTION, SOLUTION INTRAVENOUS at 10:13

## 2017-02-28 RX ADMIN — TAMSULOSIN HYDROCHLORIDE 0.4 MG: 0.4 CAPSULE ORAL at 18:27

## 2017-02-28 RX ADMIN — SODIUM CHLORIDE 150 ML/HR: 900 INJECTION, SOLUTION INTRAVENOUS at 04:00

## 2017-02-28 RX ADMIN — ACETAMINOPHEN 650 MG: 325 TABLET, FILM COATED ORAL at 08:27

## 2017-02-28 RX ADMIN — HYDROMORPHONE HYDROCHLORIDE 0.5 MG: 1 INJECTION, SOLUTION INTRAMUSCULAR; INTRAVENOUS; SUBCUTANEOUS at 17:02

## 2017-02-28 RX ADMIN — PHENAZOPYRIDINE 200 MG: 100 TABLET ORAL at 22:05

## 2017-02-28 RX ADMIN — HYDROMORPHONE HYDROCHLORIDE 1 MG: 1 INJECTION, SOLUTION INTRAMUSCULAR; INTRAVENOUS; SUBCUTANEOUS at 03:21

## 2017-02-28 RX ADMIN — OXYCODONE HYDROCHLORIDE AND ACETAMINOPHEN 1 TABLET: 5; 325 TABLET ORAL at 22:04

## 2017-02-28 RX ADMIN — PHENAZOPYRIDINE 200 MG: 100 TABLET ORAL at 11:30

## 2017-02-28 RX ADMIN — HYDROMORPHONE HYDROCHLORIDE 1 MG: 1 INJECTION, SOLUTION INTRAMUSCULAR; INTRAVENOUS; SUBCUTANEOUS at 07:57

## 2017-02-28 RX ADMIN — OXYBUTYNIN CHLORIDE 5 MG: 5 TABLET ORAL at 22:05

## 2017-02-28 RX ADMIN — LEVOFLOXACIN 750 MG: 5 INJECTION, SOLUTION INTRAVENOUS at 17:04

## 2017-02-28 RX ADMIN — OXYBUTYNIN CHLORIDE 5 MG: 5 TABLET ORAL at 17:04

## 2017-02-28 RX ADMIN — PHENAZOPYRIDINE 200 MG: 100 TABLET ORAL at 03:55

## 2017-02-28 NOTE — ROUTINE PROCESS
Verbal and bedside Shift changed report given to Geovani Miguel RN (oncoming RN) on Pt. Condition. Report consisted of patients Situation, History, Activities, intake/output,  Background, Assessment and Recommendations(SBAR). Information from the following report(s) Kardex, order Summary, Lab results and MAR was reviewed with the receiving nurse. Opportunity for questions and clarification was provided.

## 2017-02-28 NOTE — ROUTINE PROCESS
2115 Bedside and Verbal shift change  Received from THIERNO Parikh RN (outgoing nurse), to LOU Damon (oncoming)  Pt. Is AOX 4. IV patent and infusing well, Pt. denies  pain at this time. Report included the following information SBAR, Kardex, Procedure Summary, Intake/Output, MAR, Recent Lab Results, and  Cardiac Rhythm @ NSR. Will resume care and monitor Pt. Condition. Pt. Educated on call bell when in need of help and assistance. Pt. verbalized understanding. Pt. Head to toe Assessment Done and documented. 2123  Notified Dr Michelle Hawkins of Pts increasing pain. MD ordered to increase dilaudid 1mg iv every 3 hours prn and MD to order pyridium for Pt. Bladder spasm. Notified Pt. Of MD's order. Pt. And  verbalized understanding. 2222  Given 1mg of dilaudid for pain to R abd of 8/10.    2300  Pt. Resting in bed with eyes closed, no sign of pain or discomfort. 0100  Pt. Made no complaints resting comfortably. 0321  Pt. Given dilaudid per [facility] for pain management. 0430  Pt. Sleeping in bed no signs of pain. 0600  Pt. Able to rest well throughout the shift.

## 2017-02-28 NOTE — ROUTINE PROCESS
Bedside shift change report given to [address] [place] [address] (oncoming nurse) by Nohemy Muller RN (offgoing nurse). Report included the following information SBAR, Kardex, Intake/Output, MAR and Cardiac Rhythm sinus tachycardia, .

## 2017-02-28 NOTE — PROGRESS NOTES
Progress Note    Patient: Minesh Celis MRN: [id number]  SSN: [id number]    YOB: 1990  Age: 31 y.o. Sex: female      Admit Date: 2/25/2017    LOS: 2 days     Subjective:     Doing much better today, no fever. Complains if terminal dysuria and flank pain. Explained rationale of those symptoms and resumed Flomax (stoped when sepsis was suspected) and Ditropan started. Father and  at bedside, discussed plan: wait for final cultures (Bloood preliminary no growth, urine preliminary GN rods), once cultures obtained, can send home with oral antibiotics to complete 2 weeks. She know next step is URS and laser lithotripsy in two weeks; already scheduled on 3/15 at [facility]    Objective:     Vitals:    02/27/17 2133 02/28/17 0302 02/28/17 0320 02/28/17 0635   BP: 120/78 113/78  107/75   Pulse: (!) 112 86  95   Resp: 18 18  18   Temp: (!) 100.7 °F (38.2 °C) 98.8 °F (37.1 °C)  98.7 °F (37.1 °C)   SpO2: 96% 91%  95%   Weight:   189 lb 14.4 oz (86.1 kg)    Height:            Intake and Output:  Current Shift: 02/28 0701 - 02/28 1900  In: -   Out: 650 [Urine:650]  Last three shifts: 02/26 1901 - 02/28 0700  In: 1432 [P.O.:840; I.V.:2375]  Out: 3725 [Urine:3725]    Physical Exam:   GENERAL: alert, cooperative, no distress, appears stated age  ABDOMEN: soft, non-tender. Bowel sounds normal. No masses,  no organomegaly  EXTREMITIES:  extremities normal, atraumatic, no cyanosis or edema  : NO rutledge, removed.  Mild left CVAT    Lab/Data Review:  BMP:   Lab Results   Component Value Date/Time     02/28/2017 04:24 AM    K 4.0 02/28/2017 04:24 AM     (H) 02/28/2017 04:24 AM    CO2 23 02/28/2017 04:24 AM    AGAP 7 02/28/2017 04:24 AM     (H) 02/28/2017 04:24 AM    BUN 7 02/28/2017 04:24 AM    CREA 0.72 02/28/2017 04:24 AM    GFRAA >60 02/28/2017 04:24 AM    GFRNA >60 02/28/2017 04:24 AM     CBC:   Lab Results   Component Value Date/Time    WBC 5.5 02/28/2017 04:24 AM    HGB 11.2 (L) 02/28/2017 04:24 AM    HCT 35.1 02/28/2017 04:24 AM     (L) 02/28/2017 04:24 AM        Assessment:     1. Bilateral non-obstructing nephrolithiasis  2. Left obstructing ureteral stone S/P Lt JJ placement 2/26  3. Left Pyelonephritis: S/P Lt JJ placement 2/26, Blood cx 24 hours no growth; Urine Cx prelim GN rods, on      levofloxacin  4. Urosepsis: S/P Lt JJ placement 2/26. Blood cx 24 hours no growth; Urine Cx prelim GN rods, on levofloxacin. 5. Bladder spasms, now on Ditropan 5 mg Q8   6. Flank pain associated to vesico-ureteral reflux 2/2 JJ stent. Plan:     Anticipate D/C tomorrow by primary team when final cultures allow PO antibiotics  Scheduled for ureteroscopy and laser lithotripsy on 3/15 at [facility].      Signed By: Bora Oro MD     February 28, 2017

## 2017-02-28 NOTE — PROGRESS NOTES
1926  Received bedside verbal shift report. Pt lying in bed family at bedside. piv # 20 to left hand with ns infusing at 150 ml/hr. Sinus tach on telemetry box # 13. Call bell within reach, side rails up x 2, bed low and locked. Pt instructed to call for assistance. 2012  Medicated for pain     2118  Bedside and Verbal shift change report given to [address]  (oncoming nurse) by piedad harman rn  (offgoing nurse). Report included the following information SBAR, Kardex, Intake/Output, MAR, Recent Results and Cardiac Rhythm sinus tach.

## 2017-02-28 NOTE — PROGRESS NOTES
[facility]  Hospitalist Division        Inpatient Daily Progress Note    Daily progress Note    Patient: Bonnie Calzada MRN: [id number]  CSN: [id number]    YOB: 1990  Age: 31 y.o. Sex: female    DOA: 2/25/2017 LOS:  LOS: 2 days                    Chief Complaint:  Left flank pain       Subjective:          Objective:      Visit Vitals    /75 (BP 1 Location: Left arm, BP Patient Position: At rest)    Pulse 95    Temp 98.7 °F (37.1 °C)    Resp 18    Ht 5' 6\" (1.676 m)    Wt 86.1 kg (189 lb 14.4 oz)    SpO2 95%    BMI 30.65 kg/m2       Physical Exam:  General appearance: alert and oriented, cooperative, no distress, appears stated age  Head: Normocephalic, without obvious abnormality, atraumatic  Lungs: clear to auscultation throughout   Heart: regular rate and rhythm, S1, S2 normal, no murmur, click, rub or gallop  Abdomen: soft, non tender, non distended. Left CVA tenderness. Normoactive bowel sounds   Extremities: extremities normal, atraumatic, no cyanosis or edema  Skin: Skin color, texture, turgor normal. No rashes or lesions  Neurologic: Grossly normal  PSY: Mood and affect normal, appropriately behaved        Intake and Output:  Current Shift:     Last three shifts:  02/26 1901 - 02/28 0700  In: 9291 [P.O.:840; I.V.:2375]  Out: 3725 [SXSRY:5453]    Recent Results (from the past 24 hour(s))   CBC WITH AUTOMATED DIFF    Collection Time: 02/28/17  4:24 AM   Result Value Ref Range    WBC 5.5 4.6 - 13.2 K/uL    RBC 3.89 (L) 4.20 - 5.30 M/uL    HGB 11.2 (L) 12.0 - 16.0 g/dL    HCT 35.1 35.0 - 45.0 %    MCV 90.2 74.0 - 97.0 FL    MCH 28.8 24.0 - 34.0 PG    MCHC 31.9 31.0 - 37.0 g/dL    RDW 14.0 11.6 - 14.5 %    PLATELET 568 (L) 713 - 420 K/uL    MPV 11.3 9.2 - 11.8 FL    NEUTROPHILS 78 (H) 40 - 73 %    LYMPHOCYTES 14 (L) 21 - 52 %    MONOCYTES 7 3 - 10 %    EOSINOPHILS 1 0 - 5 %    BASOPHILS 0 0 - 2 %    ABS. NEUTROPHILS 4.2 1.8 - 8.0 K/UL    ABS. LYMPHOCYTES 0.8 (L) 0.9 - 3.6 K/UL    ABS. MONOCYTES 0.4 0.05 - 1.2 K/UL    ABS. EOSINOPHILS 0.1 0.0 - 0.4 K/UL    ABS. BASOPHILS 0.0 0.0 - 0.06 K/UL    DF AUTOMATED     METABOLIC PANEL, BASIC    Collection Time: 02/28/17  4:24 AM   Result Value Ref Range    Sodium 142 136 - 145 mmol/L    Potassium 4.0 3.5 - 5.5 mmol/L    Chloride 112 (H) 100 - 108 mmol/L    CO2 23 21 - 32 mmol/L    Anion gap 7 3.0 - 18 mmol/L    Glucose 103 (H) 74 - 99 mg/dL    BUN 7 7.0 - 18 MG/DL    Creatinine 0.72 0.6 - 1.3 MG/DL    BUN/Creatinine ratio 10 (L) 12 - 20      GFR est AA >60 >60 ml/min/1.73m2    GFR est non-AA >60 >60 ml/min/1.73m2    Calcium 7.4 (L) 8.5 - 10.1 MG/DL           Lab Results   Component Value Date/Time    Glucose 103 02/28/2017 04:24 AM    Glucose 103 02/27/2017 04:15 AM    Glucose 90 02/26/2017 10:35 PM    Glucose 150 02/25/2017 08:15 PM        Assessment/Plan:     Patient Active Problem List   Diagnosis Code    Renal calculi N20.0    Pyelonephritis N12       A/P:  Pyelonephritis: continue IV Levaquin. Urine culture sensitivities pending. Monitor temperature curve. Continue Ditropan, Pyridium, Flomax. Appreciate Urology assistance   Pain management: Percocet, IV Dilaudid  PRN antiemetics   DVT prophylaxis: SCDs  Advance diet to regular       BONNY Hodges-TYSHAWN [address]  Pager:  [phone]  Office:  [phone]      I examined the patient , reviewed the history, labs, and radiology reports  independently. I have reviewed the NP documentation, agree with the documentation, medical decision making and treatment plan as outlined by my NP.     Trish Hernandez MD

## 2017-03-01 LAB
ANION GAP BLD CALC-SCNC: 6 MMOL/L (ref 3–18)
BASOPHILS # BLD AUTO: 0 K/UL (ref 0–0.06)
BASOPHILS # BLD: 0 % (ref 0–2)
BUN SERPL-MCNC: 3 MG/DL (ref 7–18)
BUN/CREAT SERPL: 5 (ref 12–20)
CALCIUM SERPL-MCNC: 7.4 MG/DL (ref 8.5–10.1)
CHLORIDE SERPL-SCNC: 110 MMOL/L (ref 100–108)
CO2 SERPL-SCNC: 26 MMOL/L (ref 21–32)
CREAT SERPL-MCNC: 0.59 MG/DL (ref 0.6–1.3)
DIFFERENTIAL METHOD BLD: ABNORMAL
EOSINOPHIL # BLD: 0.2 K/UL (ref 0–0.4)
EOSINOPHIL NFR BLD: 4 % (ref 0–5)
ERYTHROCYTE [DISTWIDTH] IN BLOOD BY AUTOMATED COUNT: 13.9 % (ref 11.6–14.5)
GLUCOSE SERPL-MCNC: 102 MG/DL (ref 74–99)
HCT VFR BLD AUTO: 33.3 % (ref 35–45)
HGB BLD-MCNC: 10.7 G/DL (ref 12–16)
LYMPHOCYTES # BLD AUTO: 26 % (ref 21–52)
LYMPHOCYTES # BLD: 1.2 K/UL (ref 0.9–3.6)
MCH RBC QN AUTO: 28.8 PG (ref 24–34)
MCHC RBC AUTO-ENTMCNC: 32.1 G/DL (ref 31–37)
MCV RBC AUTO: 89.8 FL (ref 74–97)
MONOCYTES # BLD: 0.6 K/UL (ref 0.05–1.2)
MONOCYTES NFR BLD AUTO: 12 % (ref 3–10)
NEUTS SEG # BLD: 2.7 K/UL (ref 1.8–8)
NEUTS SEG NFR BLD AUTO: 58 % (ref 40–73)
PLATELET # BLD AUTO: 126 K/UL (ref 135–420)
PMV BLD AUTO: 10.3 FL (ref 9.2–11.8)
POTASSIUM SERPL-SCNC: 3.5 MMOL/L (ref 3.5–5.5)
RBC # BLD AUTO: 3.71 M/UL (ref 4.2–5.3)
SODIUM SERPL-SCNC: 142 MMOL/L (ref 136–145)
WBC # BLD AUTO: 4.7 K/UL (ref 4.6–13.2)

## 2017-03-01 PROCEDURE — 74011250636 HC RX REV CODE- 250/636: Performed by: NURSE PRACTITIONER

## 2017-03-01 PROCEDURE — 65270000029 HC RM PRIVATE

## 2017-03-01 PROCEDURE — 77030020263 HC SOL INJ SOD CL0.9% LFCR 1000ML

## 2017-03-01 PROCEDURE — 85025 COMPLETE CBC W/AUTO DIFF WBC: CPT | Performed by: NURSE PRACTITIONER

## 2017-03-01 PROCEDURE — 36415 COLL VENOUS BLD VENIPUNCTURE: CPT | Performed by: NURSE PRACTITIONER

## 2017-03-01 PROCEDURE — 74011250637 HC RX REV CODE- 250/637: Performed by: UROLOGY

## 2017-03-01 PROCEDURE — 74011250637 HC RX REV CODE- 250/637: Performed by: HOSPITALIST

## 2017-03-01 PROCEDURE — 80048 BASIC METABOLIC PNL TOTAL CA: CPT | Performed by: NURSE PRACTITIONER

## 2017-03-01 PROCEDURE — 74011250637 HC RX REV CODE- 250/637: Performed by: FAMILY MEDICINE

## 2017-03-01 PROCEDURE — 74011250636 HC RX REV CODE- 250/636: Performed by: HOSPITALIST

## 2017-03-01 PROCEDURE — 74011250637 HC RX REV CODE- 250/637: Performed by: NURSE PRACTITIONER

## 2017-03-01 RX ORDER — BISACODYL 5 MG
5 TABLET, DELAYED RELEASE (ENTERIC COATED) ORAL DAILY PRN
Status: DISCONTINUED | OUTPATIENT
Start: 2017-03-01 | End: 2017-03-02 | Stop reason: HOSPADM

## 2017-03-01 RX ORDER — ONDANSETRON 4 MG/1
4 TABLET, ORALLY DISINTEGRATING ORAL
Status: DISCONTINUED | OUTPATIENT
Start: 2017-03-01 | End: 2017-03-02 | Stop reason: HOSPADM

## 2017-03-01 RX ORDER — ALPRAZOLAM 0.5 MG/1
0.5 TABLET ORAL ONCE
Status: COMPLETED | OUTPATIENT
Start: 2017-03-01 | End: 2017-03-01

## 2017-03-01 RX ADMIN — SODIUM CHLORIDE 150 ML/HR: 900 INJECTION, SOLUTION INTRAVENOUS at 07:50

## 2017-03-01 RX ADMIN — OXYBUTYNIN CHLORIDE 5 MG: 5 TABLET ORAL at 13:04

## 2017-03-01 RX ADMIN — OXYCODONE HYDROCHLORIDE AND ACETAMINOPHEN 1 TABLET: 5; 325 TABLET ORAL at 07:50

## 2017-03-01 RX ADMIN — OXYCODONE HYDROCHLORIDE AND ACETAMINOPHEN 1 TABLET: 5; 325 TABLET ORAL at 23:11

## 2017-03-01 RX ADMIN — ONDANSETRON 4 MG: 2 INJECTION INTRAMUSCULAR; INTRAVENOUS at 03:55

## 2017-03-01 RX ADMIN — HYDROMORPHONE HYDROCHLORIDE 0.5 MG: 1 INJECTION, SOLUTION INTRAMUSCULAR; INTRAVENOUS; SUBCUTANEOUS at 16:55

## 2017-03-01 RX ADMIN — SODIUM CHLORIDE 150 ML/HR: 900 INJECTION, SOLUTION INTRAVENOUS at 01:16

## 2017-03-01 RX ADMIN — HYDROMORPHONE HYDROCHLORIDE 0.5 MG: 1 INJECTION, SOLUTION INTRAMUSCULAR; INTRAVENOUS; SUBCUTANEOUS at 05:25

## 2017-03-01 RX ADMIN — ALPRAZOLAM 0.5 MG: 0.5 TABLET ORAL at 12:54

## 2017-03-01 RX ADMIN — PHENAZOPYRIDINE 200 MG: 100 TABLET ORAL at 11:06

## 2017-03-01 RX ADMIN — HYDROMORPHONE HYDROCHLORIDE 0.5 MG: 1 INJECTION, SOLUTION INTRAMUSCULAR; INTRAVENOUS; SUBCUTANEOUS at 11:05

## 2017-03-01 RX ADMIN — BISACODYL 5 MG: 5 TABLET, COATED ORAL at 12:54

## 2017-03-01 RX ADMIN — HYDROMORPHONE HYDROCHLORIDE 0.5 MG: 1 INJECTION, SOLUTION INTRAMUSCULAR; INTRAVENOUS; SUBCUTANEOUS at 01:10

## 2017-03-01 RX ADMIN — ACETAMINOPHEN 650 MG: 325 TABLET, FILM COATED ORAL at 11:06

## 2017-03-01 RX ADMIN — OXYCODONE HYDROCHLORIDE AND ACETAMINOPHEN 2 TABLET: 5; 325 TABLET ORAL at 19:11

## 2017-03-01 RX ADMIN — ONDANSETRON 4 MG: 4 TABLET, ORALLY DISINTEGRATING ORAL at 23:22

## 2017-03-01 RX ADMIN — SODIUM CHLORIDE 150 ML/HR: 900 INJECTION, SOLUTION INTRAVENOUS at 23:13

## 2017-03-01 RX ADMIN — OXYCODONE HYDROCHLORIDE AND ACETAMINOPHEN 1 TABLET: 5; 325 TABLET ORAL at 03:32

## 2017-03-01 RX ADMIN — PHENAZOPYRIDINE 200 MG: 100 TABLET ORAL at 18:13

## 2017-03-01 RX ADMIN — OXYBUTYNIN CHLORIDE 5 MG: 5 TABLET ORAL at 05:25

## 2017-03-01 RX ADMIN — OXYBUTYNIN CHLORIDE 5 MG: 5 TABLET ORAL at 21:56

## 2017-03-01 RX ADMIN — HYDROMORPHONE HYDROCHLORIDE 0.5 MG: 1 INJECTION, SOLUTION INTRAMUSCULAR; INTRAVENOUS; SUBCUTANEOUS at 21:57

## 2017-03-01 RX ADMIN — LEVOFLOXACIN 750 MG: 5 INJECTION, SOLUTION INTRAVENOUS at 18:13

## 2017-03-01 RX ADMIN — TAMSULOSIN HYDROCHLORIDE 0.4 MG: 0.4 CAPSULE ORAL at 18:12

## 2017-03-01 NOTE — ROUTINE PROCESS
0806 Patient complaining of pain to back flank area - pain med given as ordered. IVF infusing. 5963 Febrile 100.5 - tylenol given. 1130 Pain med given for left flank pain. Visitors at bedside. Dr Garner Manual rounded on the patient. New orders received. 1430 Patient resting comfortably in the bed.   1700 Pain med given for left flank pain. New meds given, advised on side effects. [address] complaining of pain and bladder spasm. New meds started. Patient advised. 1930 Bedside and Verbal shift change report given to MAUREEN Elkins RN (oncoming nurse) by Edmundo Rodriguez RN (offgoing nurse). Report included the following information SBAR, Kardex, Intake/Output, MAR, Recent Results and Cardiac Rhythm NSR.

## 2017-03-01 NOTE — PROGRESS NOTES
1935: bedside shift report received from [address] [place] [address],[address]; Maco Pop, on room air, with IVF NS infusing well; shift assessment done; Family at bedside    2204: med given, see flowheet    0030: needs within reach; IVF infusing well    0400: pain meds given throughout the night- see flowsheet    0750: Bedside and Verbal shift change report given to Kaiser Stuart RN (oncoming nurse) by Danyel Siddiqui RN (offgoing nurse). Report included the following information SBAR, Kardex, Intake/Output, Recent Results and Cardiac Rhythm NSR.

## 2017-03-01 NOTE — PROGRESS NOTES
[facility]  Hospitalist Division        Inpatient Daily Progress Note    Daily progress Note    Patient: Rg Wilhelm MRN: [id number]  CSN: [id number]    YOB: 1990  Age: 31 y.o. Sex: female    DOA: 2/25/2017 LOS:  LOS: 3 days                    Chief Complaint:  Left flank pain       Subjective:      C/o left flank pain, crying. Anxious this morning. Objective:      Visit Vitals    /58 (BP 1 Location: Left arm, BP Patient Position: Head of bed elevated (Comment degrees))    Pulse 92    Temp 98.6 °F (37 °C)    Resp 20    Ht 5' 6\" (1.676 m)    Wt 84.7 kg (186 lb 11.2 oz)    SpO2 96%    BMI 30.13 kg/m2       Physical Exam:  General appearance: alert and oriented, cooperative, no distress, appears stated age  Head: Normocephalic, without obvious abnormality, atraumatic  Lungs: clear to auscultation throughout   Heart: regular rate and rhythm, S1, S2 normal, no murmur, click, rub or gallop  Abdomen: soft, non tender, non distended. Left CVA remains tender. Normoactive bowel sounds   Extremities: extremities normal, atraumatic, no cyanosis. Trace BLE edema   Skin: Skin color, texture, turgor normal. No rashes or lesions  Neurologic: moves all 4 extremities           Intake and Output:  Current Shift:     Last three shifts:  02/27 1901 - 03/01 0700  In: 9676 [P.O.:600;  I.V.:3425]  Out: 4700 [Urine:4700]    Recent Results (from the past 24 hour(s))   CBC WITH AUTOMATED DIFF    Collection Time: 03/01/17  4:20 AM   Result Value Ref Range    WBC 4.7 4.6 - 13.2 K/uL    RBC 3.71 (L) 4.20 - 5.30 M/uL    HGB 10.7 (L) 12.0 - 16.0 g/dL    HCT 33.3 (L) 35.0 - 45.0 %    MCV 89.8 74.0 - 97.0 FL    MCH 28.8 24.0 - 34.0 PG    MCHC 32.1 31.0 - 37.0 g/dL    RDW 13.9 11.6 - 14.5 %    PLATELET 490 (L) 212 - 420 K/uL    MPV 10.3 9.2 - 11.8 FL    NEUTROPHILS 58 40 - 73 %    LYMPHOCYTES 26 21 - 52 %    MONOCYTES 12 (H) 3 - 10 %    EOSINOPHILS 4 0 - 5 %    BASOPHILS 0 0 - 2 %    ABS. NEUTROPHILS 2.7 1.8 - 8.0 K/UL    ABS. LYMPHOCYTES 1.2 0.9 - 3.6 K/UL    ABS. MONOCYTES 0.6 0.05 - 1.2 K/UL    ABS. EOSINOPHILS 0.2 0.0 - 0.4 K/UL    ABS. BASOPHILS 0.0 0.0 - 0.06 K/UL    DF AUTOMATED     METABOLIC PANEL, BASIC    Collection Time: 03/01/17  4:20 AM   Result Value Ref Range    Sodium 142 136 - 145 mmol/L    Potassium 3.5 3.5 - 5.5 mmol/L    Chloride 110 (H) 100 - 108 mmol/L    CO2 26 21 - 32 mmol/L    Anion gap 6 3.0 - 18 mmol/L    Glucose 102 (H) 74 - 99 mg/dL    BUN 3 (L) 7.0 - 18 MG/DL    Creatinine 0.59 (L) 0.6 - 1.3 MG/DL    BUN/Creatinine ratio 5 (L) 12 - 20      GFR est AA >60 >60 ml/min/1.73m2    GFR est non-AA >60 >60 ml/min/1.73m2    Calcium 7.4 (L) 8.5 - 10.1 MG/DL           Lab Results   Component Value Date/Time    Glucose 102 03/01/2017 04:20 AM    Glucose 103 02/28/2017 04:24 AM    Glucose 103 02/27/2017 04:15 AM    Glucose 90 02/26/2017 10:35 PM    Glucose 150 02/25/2017 08:15 PM        Assessment/Plan:     Patient Active Problem List   Diagnosis Code    Renal calculi N20.0    Pyelonephritis N12       A/P:  Pyelonephritis: continue IV Levaquin. Blood cultures- NGTD. Urine culture with gram negative rods- sensitivities pending- continue to follow. Continue Ditropan, Pyridium, Flomax. Continue IVF. Appreciate Urology assistance   Lithotripsy scheduled for 3/15/2017 by Urology   Pain management: Percocet, IV Dilaudid  PRN antiemetics   DVT prophylaxis: B[facility]  Pager:  [phone]  Office:  [phone]      I examined the patient , reviewed the history, labs, and radiology reports  independently. I have reviewed the NP documentation, agree with the documentation, medical decision making and treatment plan as outlined by my NP.     Jose Juares MD

## 2017-03-01 NOTE — PROGRESS NOTES
[address] March 1, 2017       RE: Rolan Hatch      To Whom It May Concern,    This is to certify that Rolan Hatch has been admitted since 2/26/2017. Please excuse her from work through March 7th, 2017. Please feel free to contact my office if you have any questions or concerns. Thank you for your assistance in this matter.       Sincerely,      Cora Richards, KAYLA  [phone]

## 2017-03-01 NOTE — PROGRESS NOTES
ASSESSMENT:     ICD-10-CM ICD-9-CM    1. Renal calculus, left N20.0 592.0    2. Pyelonephritis N12 590.80    3. Fever in adult R50.9 780.60               PLAN:    · S/p stent placement  · Feels very well  · Plan for definitive treatment with Dr. Gloria Ward all set up already  · Plan is for discharge in AM  · Will see Thursday only if not discharged         Chief Complaint   Patient presents with    Urinary Pain    Flank Pain    Fever       HISTORY OF PRESENT ILLNESS:  Nessa Anneman is a 31 y.o. female who had stone and infection s/p stent placement. Feels very well and ready for discharge. Plan is to keep on one day po antibiotics and discharge tomorrow. No flowsheet data found.     Past Medical History:   Diagnosis Date    Kidney stone        Past Surgical History:   Procedure Laterality Date    HX OTHER SURGICAL      oral surgery       Social History   Substance Use Topics    Smoking status: Never Smoker    Smokeless tobacco: None    Alcohol use Yes      Comment: occ       Allergies   Allergen Reactions    Pcn [Penicillins] Rash and Other (comments)     Stomach cramps       Family History   Problem Relation Age of Onset    Thyroid Disease Mother     Asthma Father     Asthma Brother     Cancer Maternal Aunt     Breast Cancer Maternal Grandmother        Current Facility-Administered Medications   Medication Dose Route Frequency Provider Last Rate Last Dose    ondansetron (ZOFRAN ODT) tablet 4 mg  4 mg Oral Q8H PRN Toya Borrero NP        tamsulosin (FLOMAX) capsule 0.4 mg  0.4 mg Oral QPM Kathi Mitchell MD   0.4 mg at 02/28/17 1827    oxybutynin (DITROPAN) tablet 5 mg  5 mg Oral Q8H Kathi Mitchell MD   5 mg at 03/01/17 0525    oxyCODONE-acetaminophen (PERCOCET) 5-325 mg per tablet 1-2 Tab  1-2 Tab Oral Q4H PRN Toya Borrero NP   1 Tab at 03/01/17 0750    HYDROmorphone (PF) (DILAUDID) injection 0.5 mg  0.5 mg IntraVENous Q4H PRN Toya Borrero NP   0.5 mg at 03/01/17 1105    phenazopyridine (PYRIDIUM) tablet 200 mg  200 mg Oral TIDPC Rosangela Montes MD   200 mg at 03/01/17 1106    0.9% sodium chloride infusion  150 mL/hr IntraVENous CONTINUOUS Lysle Matte,  mL/hr at 03/01/17 0750 150 mL/hr at 03/01/17 0750    acetaminophen (TYLENOL) tablet 650 mg  650 mg Oral Q6H PRN Sujey Hartley MD   650 mg at 03/01/17 1106    levoFLOXacin (LEVAQUIN) 750 mg in D5W IVPB  750 mg IntraVENous Q24H Brandy Saenz,  mL/hr at 02/28/17 1704 750 mg at 02/28/17 1704    sodium chloride (NS) flush 5-10 mL  5-10 mL IntraVENous PRN Marbin Kebede MD   10 mL at 02/28/17 0321           REVIEW OF LABS AND IMAGING:            A copy of today's office visit with all pertinent imaging results and labs were sent to the referring physician.         Antony Velazquez MD

## 2017-03-02 VITALS
TEMPERATURE: 98.6 F | DIASTOLIC BLOOD PRESSURE: 70 MMHG | OXYGEN SATURATION: 92 % | BODY MASS INDEX: 30.04 KG/M2 | RESPIRATION RATE: 16 BRPM | HEIGHT: 66 IN | SYSTOLIC BLOOD PRESSURE: 103 MMHG | WEIGHT: 186.9 LBS | HEART RATE: 92 BPM

## 2017-03-02 LAB
ANION GAP BLD CALC-SCNC: 7 MMOL/L (ref 3–18)
BACTERIA SPEC CULT: ABNORMAL
BASOPHILS # BLD AUTO: 0 K/UL (ref 0–0.06)
BASOPHILS # BLD: 1 % (ref 0–2)
BUN SERPL-MCNC: 4 MG/DL (ref 7–18)
BUN/CREAT SERPL: 7 (ref 12–20)
CALCIUM SERPL-MCNC: 8 MG/DL (ref 8.5–10.1)
CHLORIDE SERPL-SCNC: 110 MMOL/L (ref 100–108)
CO2 SERPL-SCNC: 27 MMOL/L (ref 21–32)
CREAT SERPL-MCNC: 0.6 MG/DL (ref 0.6–1.3)
DIFFERENTIAL METHOD BLD: ABNORMAL
EOSINOPHIL # BLD: 0.1 K/UL (ref 0–0.4)
EOSINOPHIL NFR BLD: 4 % (ref 0–5)
ERYTHROCYTE [DISTWIDTH] IN BLOOD BY AUTOMATED COUNT: 13.7 % (ref 11.6–14.5)
GLUCOSE SERPL-MCNC: 86 MG/DL (ref 74–99)
HCT VFR BLD AUTO: 37.1 % (ref 35–45)
HGB BLD-MCNC: 11.6 G/DL (ref 12–16)
LYMPHOCYTES # BLD AUTO: 44 % (ref 21–52)
LYMPHOCYTES # BLD: 1.7 K/UL (ref 0.9–3.6)
MCH RBC QN AUTO: 28.1 PG (ref 24–34)
MCHC RBC AUTO-ENTMCNC: 31.3 G/DL (ref 31–37)
MCV RBC AUTO: 89.8 FL (ref 74–97)
MONOCYTES # BLD: 0.4 K/UL (ref 0.05–1.2)
MONOCYTES NFR BLD AUTO: 11 % (ref 3–10)
NEUTS SEG # BLD: 1.5 K/UL (ref 1.8–8)
NEUTS SEG NFR BLD AUTO: 40 % (ref 40–73)
PLATELET # BLD AUTO: 146 K/UL (ref 135–420)
PMV BLD AUTO: 10.1 FL (ref 9.2–11.8)
POTASSIUM SERPL-SCNC: 3.5 MMOL/L (ref 3.5–5.5)
RBC # BLD AUTO: 4.13 M/UL (ref 4.2–5.3)
SERVICE CMNT-IMP: ABNORMAL
SODIUM SERPL-SCNC: 144 MMOL/L (ref 136–145)
WBC # BLD AUTO: 3.8 K/UL (ref 4.6–13.2)

## 2017-03-02 PROCEDURE — 80048 BASIC METABOLIC PNL TOTAL CA: CPT | Performed by: NURSE PRACTITIONER

## 2017-03-02 PROCEDURE — 74011250636 HC RX REV CODE- 250/636: Performed by: NURSE PRACTITIONER

## 2017-03-02 PROCEDURE — 85025 COMPLETE CBC W/AUTO DIFF WBC: CPT | Performed by: NURSE PRACTITIONER

## 2017-03-02 PROCEDURE — 36415 COLL VENOUS BLD VENIPUNCTURE: CPT | Performed by: NURSE PRACTITIONER

## 2017-03-02 PROCEDURE — 77030020263 HC SOL INJ SOD CL0.9% LFCR 1000ML

## 2017-03-02 PROCEDURE — 74011250637 HC RX REV CODE- 250/637: Performed by: UROLOGY

## 2017-03-02 PROCEDURE — 74011250637 HC RX REV CODE- 250/637: Performed by: NURSE PRACTITIONER

## 2017-03-02 RX ORDER — ONDANSETRON 4 MG/1
4 TABLET, ORALLY DISINTEGRATING ORAL
Qty: 30 TAB | Refills: 0 | Status: SHIPPED | OUTPATIENT
Start: 2017-03-02 | End: 2017-03-23

## 2017-03-02 RX ORDER — TAMSULOSIN HYDROCHLORIDE 0.4 MG/1
0.4 CAPSULE ORAL EVERY EVENING
Qty: 30 CAP | Refills: 0 | Status: SHIPPED | OUTPATIENT
Start: 2017-03-02 | End: 2017-04-17

## 2017-03-02 RX ORDER — LEVOFLOXACIN 750 MG/1
750 TABLET ORAL DAILY
Qty: 10 TAB | Refills: 0 | Status: SHIPPED | OUTPATIENT
Start: 2017-03-02 | End: 2017-04-17

## 2017-03-02 RX ORDER — OXYBUTYNIN CHLORIDE 5 MG/1
5 TABLET ORAL EVERY 8 HOURS
Qty: 60 TAB | Refills: 0 | Status: SHIPPED | OUTPATIENT
Start: 2017-03-02 | End: 2017-04-17

## 2017-03-02 RX ORDER — OXYCODONE AND ACETAMINOPHEN 7.5; 325 MG/1; MG/1
1 TABLET ORAL
Qty: 30 TAB | Refills: 0 | Status: SHIPPED | OUTPATIENT
Start: 2017-03-02 | End: 2017-04-17

## 2017-03-02 RX ADMIN — HYDROMORPHONE HYDROCHLORIDE 0.5 MG: 1 INJECTION, SOLUTION INTRAMUSCULAR; INTRAVENOUS; SUBCUTANEOUS at 09:08

## 2017-03-02 RX ADMIN — OXYBUTYNIN CHLORIDE 5 MG: 5 TABLET ORAL at 06:02

## 2017-03-02 RX ADMIN — HYDROMORPHONE HYDROCHLORIDE 0.5 MG: 1 INJECTION, SOLUTION INTRAMUSCULAR; INTRAVENOUS; SUBCUTANEOUS at 05:06

## 2017-03-02 RX ADMIN — OXYCODONE HYDROCHLORIDE AND ACETAMINOPHEN 1 TABLET: 5; 325 TABLET ORAL at 06:02

## 2017-03-02 RX ADMIN — BISACODYL 5 MG: 5 TABLET, COATED ORAL at 09:11

## 2017-03-02 RX ADMIN — SODIUM CHLORIDE 150 ML/HR: 900 INJECTION, SOLUTION INTRAVENOUS at 06:00

## 2017-03-02 NOTE — DISCHARGE SUMMARY
[facility]    Discharge Summary    Patient: Carlo See MRN: [id number]  CSN: [id number]    YOB: 1990  Age: 31 y.o. Sex: female    DOA: 2/25/2017 LOS:  LOS: 4 days   Discharge Date: 3/2/2017     Admission Diagnoses: Pyelonephritis  Renal calculi  stent    Discharge Diagnoses:    Problem List as of 3/2/2017  Date Reviewed: 12/11/2013          Codes Class Noted - Resolved    Renal calculi ICD-10-CM: N20.0  ICD-9-CM: 592.0  2/26/2017 - Present        Pyelonephritis ICD-10-CM: N12  ICD-9-CM: 590.80  2/26/2017 - Present              Discharge Condition: Stable    Discharge To: Home    Consults: Hospitalist and Urology    Hospital Course: The patient is a 25-year-old female who presented to the ED complaining of left flank pain. The  patient stated that the pain began around 10:30 yesterday morning. It persisted throughout the day, she decided to come to the ED. The patient stated that the pain was 7/10, it was a dull pain, constant. She reported a possible fever right before coming into the ED. She has never had symptoms like this before. While in the ER, the patient was found to have renal colliculi in the left ureter and pyelonephritis on CT. She was also tachycardic. The patient was started on IV antibiotics and Flomax with IV fluids. The patient was admitted for further management. Urology was consulted. Patient underwent cystoscopy and left ureteral stent placement. Patient was arranged to have lithotripsy on 3/15/2017 by Urology at [facility]. Patient continued with IV fluids, IV antibiotics and pain medication. Patient had fever during hospital admission which resolved greater than 24 hours prior to discharge. Urine culture grew E. Coli with 2 morphotypes with the same susceptibility. Patient was changed from IV Levaquin to PO Levaquin and will complete a total of 14 day course in addition to Flomax and Oxybutynin.  Patient was instructed by Urology to follow up as an outpatient on 3/8/2017 and then again on 3/15/2017 for scheduled procedure. Patient was advised to see her PCP within 1 week and to also direct [place] paperwork to per PCP. Patient is stable for discharge home.      Significant Diagnostic Studies:   Recent Results (from the past 24 hour(s))   METABOLIC PANEL, BASIC    Collection Time: 03/02/17  4:40 AM   Result Value Ref Range    Sodium 144 136 - 145 mmol/L    Potassium 3.5 3.5 - 5.5 mmol/L    Chloride 110 (H) 100 - 108 mmol/L    CO2 27 21 - 32 mmol/L    Anion gap 7 3.0 - 18 mmol/L    Glucose 86 74 - 99 mg/dL    BUN 4 (L) 7.0 - 18 MG/DL    Creatinine 0.60 0.6 - 1.3 MG/DL    BUN/Creatinine ratio 7 (L) 12 - 20      GFR est AA >60 >60 ml/min/1.73m2    GFR est non-AA >60 >60 ml/min/1.73m2    Calcium 8.0 (L) 8.5 - 10.1 MG/DL   CBC WITH AUTOMATED DIFF    Collection Time: 03/02/17  4:40 AM   Result Value Ref Range    WBC 3.8 (L) 4.6 - 13.2 K/uL    RBC 4.13 (L) 4.20 - 5.30 M/uL    HGB 11.6 (L) 12.0 - 16.0 g/dL    HCT 37.1 35.0 - 45.0 %    MCV 89.8 74.0 - 97.0 FL    MCH 28.1 24.0 - 34.0 PG    MCHC 31.3 31.0 - 37.0 g/dL    RDW 13.7 11.6 - 14.5 %    PLATELET 697 149 - 448 K/uL    MPV 10.1 9.2 - 11.8 FL    NEUTROPHILS 40 40 - 73 %    LYMPHOCYTES 44 21 - 52 %    MONOCYTES 11 (H) 3 - 10 %    EOSINOPHILS 4 0 - 5 %    BASOPHILS 1 0 - 2 %    ABS. NEUTROPHILS 1.5 (L) 1.8 - 8.0 K/UL    ABS. LYMPHOCYTES 1.7 0.9 - 3.6 K/UL    ABS. MONOCYTES 0.4 0.05 - 1.2 K/UL    ABS. EOSINOPHILS 0.1 0.0 - 0.4 K/UL    ABS. BASOPHILS 0.0 0.0 - 0.06 K/UL    DF AUTOMATED           Discharge Medications:     Current Discharge Medication List      START taking these medications    Details   ondansetron (ZOFRAN ODT) 4 mg disintegrating tablet Take 1 Tab by mouth every eight (8) hours as needed. Qty: 30 Tab, Refills: 0      oxybutynin (DITROPAN) 5 mg tablet Take 1 Tab by mouth every eight (8) hours. Qty: 60 Tab, Refills: 0      tamsulosin (FLOMAX) 0.4 mg capsule Take 1 Cap by mouth every evening.   Qty: 30 Cap, Refills: 0 oxyCODONE-acetaminophen (PERCOCET) 7.5-325 mg per tablet Take 1 Tab by mouth every four (4) hours as needed for Pain. Max Daily Amount: 6 Tabs. Qty: 30 Tab, Refills: 0      levoFLOXacin (LEVAQUIN) 750 mg tablet Take 1 Tab by mouth daily. Qty: 10 Tab, Refills: 0         CONTINUE these medications which have NOT CHANGED    Details   norgestimate-ethinyl estradiol (TRI-SPRINTEC, 28,) 0.18/0.215/0.25 mg-35 mcg (28) tablet Take  by mouth. STOP taking these medications       HYDROcodone-acetaminophen (NORCO) 5-325 mg per tablet Comments:   Reason for Stopping:               Activity: Activity as tolerated, No sex for 4 weeks and No driving while on analgesics    Diet: Regular Diet    Wound Care: None needed    Follow-up: PCP in 1 week  Urology as instructed  [facility] on 3/15 for scheduled procedure     Discharge time: 45 minutes   Coty Duncan NP  3/2/2017, 11:05 AM      I examined the patient , reviewed the history, labs, and radiology reports  independently. I have reviewed the NP documentation, agree with the documentation, medical decision making and treatment plan as outlined by my NP.     Abbey Howard MD

## 2017-03-02 NOTE — PROGRESS NOTES
1230: Discharge instructions reviewed thoroughly, pt. was given time for questions. Taken down for discharge at 1235.

## 2017-03-02 NOTE — ROUTINE PROCESS
1910 Bedside and Verbal shift change report given to Surjit Cantrell (oncoming nurse) by Symone Klein RN   (offgoing nurse). Report included the following information Kardex, Intake/Output and Recent Results.

## 2017-03-02 NOTE — PROGRESS NOTES
Pt doing well this AM. Still having moderate L-sided flank pain. Voiding orange urine. C/o pain in back after voiding but not during urination. Afebrile overnight. NS infusing @ 150/hr. Medicated with alternating Percocet & Dilaudid. Last BM: 2/25.  at bedside.

## 2017-03-02 NOTE — PROGRESS NOTES
1927 Bedside and Verbal shift change report given to ANGIE Kelly RN (oncoming nurse) by Traci Villalta. Erlin RN (offgoing nurse). Report included the following information SBAR, Kardex, MAR and Recent Results. Bed in low position, call bell within reach, and patient offered no complains. Will continue to monitor. 2038 Shift assessment completed. 9147 Reassessment completed, no changes to previous assessment. PRN medications have been administered per pt's request and order. Will continue to monitor. 0750 Bedside and Verbal shift change report given to Ash Orlando RN (oncoming nurse) by Rani Arias RN (offgoing nurse). Report included the following information SBAR, Kardex, MAR and Recent Results.

## 2017-03-05 LAB
BACTERIA SPEC CULT: NORMAL
BACTERIA SPEC CULT: NORMAL
SERVICE CMNT-IMP: NORMAL
SERVICE CMNT-IMP: NORMAL

## 2021-08-03 PROBLEM — N20.0 RENAL CALCULI: Status: RESOLVED | Noted: 2017-02-26 | Resolved: 2021-08-03

## 2021-08-03 PROBLEM — N20.0 KIDNEY STONE: Status: RESOLVED | Noted: 2021-08-03 | Resolved: 2021-08-03

## 2022-09-01 ENCOUNTER — APPOINTMENT (OUTPATIENT)
Dept: PHYSICAL THERAPY | Age: 32
End: 2022-09-01
Payer: COMMERCIAL

## 2022-09-01 ENCOUNTER — HOSPITAL ENCOUNTER (OUTPATIENT)
Dept: PHYSICAL THERAPY | Age: 32
Discharge: HOME | End: 2022-09-01
Payer: COMMERCIAL

## 2022-09-01 PROCEDURE — 97110 THERAPEUTIC EXERCISES: CPT

## 2022-09-01 PROCEDURE — 97162 PT EVAL MOD COMPLEX 30 MIN: CPT

## 2022-09-01 NOTE — PROGRESS NOTES
PELVIC FLOOR DAILY TREATMENT NOTE     Patient Name: Paxton Adams  Date:2022  : 1990  [x]  Patient  Verified  Payor: BLUE CROSS / Plan: [facility] PPO / Product Type: PPO /    In time:8:09  Out time:8:56  Total Treatment Time (min): 47  Total Timed Codes (min): 23  1:1 Treatment Time (min): 23   Visit #: 1 of 8    Treatment Area: Muscle wasting and atrophy, not elsewhere classified, other site [M62.58]  Unspecified dyspareunia [N94.10]    SUBJECTIVE  Pain Level (0-10 scale): 0  Any medication changes, allergies to medications, adverse drug reactions, diagnosis change, or new procedure performed?: [x] No    [] Yes (see summary sheet for update)  Subjective functional status/changes:   [] No changes reported  See POC    OBJECTIVE      Billed As:   [x] TE 23 min   [] TA   [] Neuro   [] Self Care Patient Education: [x] Review HEP /POC/Goals    Educated Pt in pelvic floor anatomy, function/dysfunction and correct execution of a pelvic floor contraction. Reviewed biofeedback results. [] Progressed/Changed HEP based on:   [] positioning   [] body mechanics   [] transfers   [] heat/ice application    [] other:        Pain Level (0-10 scale) post treatment: 0    ASSESSMENT/Changes in Function:   Justification for Eval Code Complexity:  Patient History : See POC  Examination see exam   Clinical Presentation: evolving  Clinical Decision Making : GROC    Patient will continue to benefit from skilled PT services to modify and progress therapeutic interventions, address strength deficits, instruct in home and community integration, and Address fecal incontinence, dyspareunia, difficulty using a tampon, inability to use menstrual cup and perineal heaviness  to attain remaining goals.      [x]  See Plan of Care  []  See progress note/recertification  []  See Discharge Summary         Progress towards goals / Updated goals:  Initial evaluation completed with home exercise program and education initiated.        PLAN  [x]  Upgrade activities as tolerated     []  Continue plan of care  []  Update interventions per flow sheet       []  Discharge due to:_  []  Other:_      Elmichelineda January, PT 9/1/2022  8:56 AM      Future Appointments   Date Time Provider Leticia Agrawal   10/5/2022  3:10 PM Edith Loyola, PT [facility]   10/10/2022  3:10 PM Edith Loyola, PT [facility]   10/17/2022  3:10 PM Edith Loyola, PT [facility]   10/26/2022  3:10 PM Edith Loyola, PT [facility]   10/31/2022  3:10 PM Edith Loyola, PT [facility]   11/7/2022  3:10 PM Edith Loyola, PT [facility]

## 2022-09-01 NOTE — THERAPY EVALUATION
[facility] PHYSICAL THERAPY  [x]  At [facility] [address] [address] - Phone: [phone] Fax: [phone] / [facility]  Patient Name: Katy Teixeira : 1990   Medical   Diagnosis: Muscle wasting and atrophy, not elsewhere classified, other site [M62.58]  Unspecified dyspareunia [N94.10] Treatment Diagnosis: Muscle wasting and atrophy, not elsewhere classified, other site [M62.58]  Unspecified dyspareunia [N94.10]   Onset Date: Spring 2022     Referral Source: Mauri Doll NP Start of Care [facility]): 2022   Prior Hospitalization: See medical history Provider #: [id number]   Prior Level of Function: Symptom free with ADLs   Comorbidities: , History of kidney stones treated with silver nitrate   Medications: Verified on Patient Summary List   The Plan of Care and following information is based on the information from the initial evaluation.   ==================================================================================  Assessment / key information:  Patient is a 34 y.o. yo female who presents to [facility] with diagnosis of Muscle wasting and atrophy, not elsewhere classified, other site [M62.58]  Unspecified dyspareunia [N94.10]. Patient reports occasional dyspareunia, discomfort with tampon use, inability to use a menstrual cup secondary to falls out, feelings of heaviness in the perineum and fecal smearing. Patient is G1, P1 with complicated vaginal delivery including failed vacuum assisted and first degree tear. BMs are 1x day. She denies urinary symptoms. Upon objective evaluation, patient demonstrates impaired strength of the pelvic floor muscles. There was no tenderness to palpation over pelvic floor muscles in the perineum or vagina.   Strength of pelvic floor muscles was impaired at 1/5 with PERF score 1/0/0/4   On biofeedback patient presented with normal resting tone of pelvic floor . There was low net rise of fast twitch contraction at 10.03 microvolts and low net rise of slow twitch contraction at 11.89 microvolts. Patient can benefit from PT for retraining of muscle control n biofeedback and therapeutic exercises to increase pelvic floor muscle strength, increase fecal continence, Increase ability to use a tampon and menstrual cup improve quality of life.  ==================================================================================  Eval Complexity: History: MEDIUM  Complexity : 1-2 comorbidities / personal factors will impact the outcome/ POC Exam:HIGH Complexity : 4+ Standardized tests and measures addressing body structure, function, activity limitation and / or participation in recreation  Presentation: MEDIUM Complexity : Evolving with changing characteristics  Clinical Decision Making:Other outcome measures GROC  HIGH Overall Complexity:MEDIUM  Problem List: Pelvic pain/dysfunction, Decreased pelvic floor mm awareness, Decreased pelvic floor mm strength, Use of accessory muscles, and Other   Treatment Plan may include any combination of the following: Therapeutic exercise, Neuromuscular re-education, Manual therapy, Physical agent/modality, Patient education, and Other  Patient / Family readiness to learn indicated by: asking questions, trying to perform skills, and interest  Persons(s) to be included in education: patient (P)  Barriers to Learning/Limitations: None  Measures taken:    Patient Goal (s): Decrease feelings of heaviness, fecal smearing, painful sex and be able to use a menstrual cup   Patient self reported health status: good  Rehabilitation Potential: excellent  Short Term Goals: To be accomplished in 4 weeks:     1) Patient performing pelvic floor exercises 3x day. 2) Patient will  score +2 on Global rating of change indicating symptoms to be a little better. Nelida Min      3) Patient will report 25% improvement in fecal smearing     4) Patient will increase net rise of slow twitch contraction to 17 microvolts to increase ability to use tampon and menstrual cup. Long Term Goals: To be accomplished in 8 weeks:   1) Patient independent in HEP. 2) Patient will report 50% improvement in pain with sexual intercourse. 3)  Patient will  score +4 on Global rating of change indicating symptoms to be moderately better. 4) Patient will increase net rise of slow twitch contraction to 22 microvolts  to increase  ability to use tampon and menstrual cup. Frequency / Duration:   Patient to be seen  1  times per week for 8  weeks:  Patient / Caregiver education and instruction: Exercises    Therapist Signature: Sue Miller PT Date: 7/2/6673   Certification Period: na Time: 11:18 AM   ==================================================================================  I certify that the above Physical Therapy Services are being furnished while the patient is under my care. I agree with the treatment plan and certify that this therapy is necessary. na  Physician Signature:        Date:       Time:                                         Filipe Jimenez NP  Please sign and return to [facility]. or you may fax the signed copy to [phone]. Thank you.

## 2022-10-05 ENCOUNTER — TELEPHONE (OUTPATIENT)
Dept: PHYSICAL THERAPY | Age: 32
End: 2022-10-05

## 2022-10-05 ENCOUNTER — APPOINTMENT (OUTPATIENT)
Dept: PHYSICAL THERAPY | Age: 32
End: 2022-10-05

## 2022-10-10 ENCOUNTER — TELEPHONE (OUTPATIENT)
Dept: PHYSICAL THERAPY | Age: 32
End: 2022-10-10

## 2022-10-10 ENCOUNTER — HOSPITAL ENCOUNTER (OUTPATIENT)
Dept: PHYSICAL THERAPY | Age: 32
End: 2022-10-10

## 2022-10-10 NOTE — THERAPY DISCHARGE
[address] [facility] PHYSICAL THERAPY  [address] [place] [address], [facility], [facility] - Phone: [phone]  Fax: [phone]    DISCHARGE NOTE  Patient Name: Candis Lai : 1990   Treatment/Medical Diagnosis: Muscle wasting and atrophy, not elsewhere classified, other site [M62.58]  Unspecified dyspareunia [N94.10]   Referral Source: Shanon Linn NP     Date of Initial Visit: 2022 Attended Visits: 1 Missed Visits: 1       SUMMARY OF TREATMENT  Pt attended only initial evaluation then did not return secondary to scheduling difficulties. Therefore a formal reassessment of goals was not performed. RECOMMENDATIONS  Discontinue physical therapy due to patient not returning. If you have any questions/comments please contact us directly at [phone]. Thank you for allowing us to assist in the care of your patient. Therapist Signature:  Cuauhtemoc Benson PT Date: 10/10/2022     Time: 3:35 PM

## 2022-10-17 ENCOUNTER — APPOINTMENT (OUTPATIENT)
Dept: PHYSICAL THERAPY | Age: 32
End: 2022-10-17

## 2022-10-26 ENCOUNTER — APPOINTMENT (OUTPATIENT)
Dept: PHYSICAL THERAPY | Age: 32
End: 2022-10-26

## 2022-10-31 ENCOUNTER — APPOINTMENT (OUTPATIENT)
Dept: PHYSICAL THERAPY | Age: 32
End: 2022-10-31

## 2022-11-07 ENCOUNTER — APPOINTMENT (OUTPATIENT)
Dept: PHYSICAL THERAPY | Age: 32
End: 2022-11-07

## 2024-05-24 NOTE — DISCHARGE INSTRUCTIONS
DISCHARGE SUMMARY from Nurse    The following personal items are in your possession at time of discharge:    Dental Appliances: None  Visual Aid: Glasses     Home Medications: None  Jewelry: Ring (2 silver ring with stones)  Clothing: Shirt, Undergarments, Pants, Footwear  Other Valuables: Cell Phone             PATIENT INSTRUCTIONS:    After general anesthesia or intravenous sedation, for 24 hours or while taking prescription Narcotics:  · Limit your activities  · Do not drive and operate hazardous machinery  · Do not make important personal or business decisions  · Do  not drink alcoholic beverages  · If you have not urinated within 8 hours after discharge, please contact your surgeon on call. Report the following to your surgeon:  · Excessive pain, swelling, redness or odor of or around the surgical area  · Temperature over 100.5  · Nausea and vomiting lasting longer than 4 hours or if unable to take medications  · Any signs of decreased circulation or nerve impairment to extremity: change in color, persistent  numbness, tingling, coldness or increase pain  · Any questions        What to do at Home:  Recommended activity: Activity as tolerated. If you experience any of the following symptoms Shortness of Breath, Chest Pain, please follow up with Your Primary Care Provider, or go to the closest Emergency Room. *  Please give a list of your current medications to your Primary Care Provider. *  Please update this list whenever your medications are discontinued, doses are      changed, or new medications (including over-the-counter products) are added. *  Please carry medication information at all times in case of emergency situations. These are general instructions for a healthy lifestyle:    No smoking/ No tobacco products/ Avoid exposure to second hand smoke    Surgeon General's Warning:  Quitting smoking now greatly reduces serious risk to your health.     Obesity, smoking, and sedentary lifestyle greatly increases your risk for illness    A healthy diet, regular physical exercise & weight monitoring are important for maintaining a healthy lifestyle    You may be retaining fluid if you have a history of heart failure or if you experience any of the following symptoms:  Weight gain of 3 pounds or more overnight or 5 pounds in a week, increased swelling in our hands or feet or shortness of breath while lying flat in bed. Please call your doctor as soon as you notice any of these symptoms; do not wait until your next office visit. Recognize signs and symptoms of STROKE:    F-face looks uneven    A-arms unable to move or move unevenly    S-speech slurred or non-existent    T-time-call 911 as soon as signs and symptoms begin-DO NOT go       Back to bed or wait to see if you get better-TIME IS BRAIN. Warning Signs of HEART ATTACK     Call 911 if you have these symptoms:   Chest discomfort. Most heart attacks involve discomfort in the center of the chest that lasts more than a few minutes, or that goes away and comes back. It can feel like uncomfortable pressure, squeezing, fullness, or pain.  Discomfort in other areas of the upper body. Symptoms can include pain or discomfort in one or both arms, the back, neck, jaw, or stomach.  Shortness of breath with or without chest discomfort.  Other signs may include breaking out in a cold sweat, nausea, or lightheadedness. Don't wait more than five minutes to call 911 - MINUTES MATTER! Fast action can save your life. Calling 911 is almost always the fastest way to get lifesaving treatment. Emergency Medical Services staff can begin treatment when they arrive -- up to an hour sooner than if someone gets to the hospital by car. The discharge information has been reviewed with the patient. The patient verbalized understanding.     Discharge medications reviewed with the patient       Kidney Stone: Care Instructions  Your Care Instructions    Kidney stones are formed when salts, minerals, and other substances normally found in the urine clump together. They can be as small as grains of sand or, rarely, as large as golf balls. While the stone is traveling through the ureter, which is the tube that carries urine from the kidney to the bladder, you will probably feel pain. The pain may be mild or very severe. You may also have some blood in your urine. As soon as the stone reaches the bladder, any intense pain should go away. If a stone is too large to pass on its own, you may need a medical procedure to help you pass the stone. The doctor has checked you carefully, but problems can develop later. If you notice any problems or new symptoms, get medical treatment right away. Follow-up care is a key part of your treatment and safety. Be sure to make and go to all appointments, and call your doctor if you are having problems. It's also a good idea to know your test results and keep a list of the medicines you take. How can you care for yourself at home? · Drink plenty of fluids, enough so that your urine is light yellow or clear like water. If you have kidney, heart, or liver disease and have to limit fluids, talk with your doctor before you increase the amount of fluids you drink. · Take pain medicines exactly as directed. Call your doctor if you think you are having a problem with your medicine. ¨ If the doctor gave you a prescription medicine for pain, take it as prescribed. ¨ If you are not taking a prescription pain medicine, ask your doctor if you can take an over-the-counter medicine. Read and follow all instructions on the label. · Your doctor may ask you to strain your urine so that you can collect your kidney stone when it passes. You can use a kitchen strainer or a tea strainer to catch the stone. Store it in a plastic bag until you see your doctor again.   Preventing future kidney stones  Some changes in your diet may help prevent kidney stones. Depending on the cause of your stones, your doctor may recommend that you:  · Drink plenty of fluids, enough so that your urine is light yellow or clear like water. If you have kidney, heart, or liver disease and have to limit fluids, talk with your doctor before you increase the amount of fluids you drink. · Limit coffee, tea, and alcohol. Also avoid grapefruit juice. · Do not take more than the recommended daily dose of vitamins C and D.  · Avoid antacids such as Gaviscon, Maalox, Mylanta, or Tums. · Limit the amount of salt (sodium) in your diet. · Eat a balanced diet that is not too high in protein. · Limit foods that are high in a substance called oxalate, which can cause kidney stones. These foods include dark green vegetables, rhubarb, chocolate, wheat bran, nuts, cranberries, and beans. When should you call for help? Call your doctor now or seek immediate medical care if:  · You cannot keep down fluids. · Your pain gets worse. · You have a fever or chills. · You have new or worse pain in your back just below your rib cage (the flank area). · You have new or more blood in your urine. Watch closely for changes in your health, and be sure to contact your doctor if:  · You do not get better as expected. Where can you learn more? Go to http://giovanny-stewart.info/. Enter X073 in the search box to learn more about \"Kidney Stone: Care Instructions. \"  Current as of: November 20, 2015  Content Version: 11.1  © 9428-5029 [employer]. Care instructions adapted under license by [employer] (which disclaims liability or warranty for this information). If you have questions about a medical condition or this instruction, always ask your healthcare professional. [address] any warranty or liability for your use of this information. and appropriate educational materials and side effects teaching were provided. No

## (undated) DEVICE — GOWN,AURORA,FABRIC-REINFORCED,X-LARGE: Brand: MEDLINE

## (undated) DEVICE — Device

## (undated) DEVICE — BAG DRNGE NONSTERILE W/ SUCT HOSE CYSTO/UROLOGICAL FOR GE

## (undated) DEVICE — SYRINGE MED 3ML NDL 18GA L1.5IN BLNT FILL LUERLOCK TIP DISP

## (undated) DEVICE — KENDALL SCD EXPRESS SLEEVES, KNEE LENGTH, MEDIUM: Brand: KENDALL SCD

## (undated) DEVICE — SYRINGE MED 20ML STD CLR PLAS LUERLOCK TIP N CTRL DISP

## (undated) DEVICE — SLEEVE COMPR L THGH LEN WARP

## (undated) DEVICE — SPONGE GZ W4XL4IN COT 12 PLY TYP VII WVN C FLD DSGN

## (undated) DEVICE — GDWIRE URET STR STD .038X150 -- ZIPWIRE STD

## (undated) DEVICE — CONTAINER DRN 20L DISP FLUIDRN LLS

## (undated) DEVICE — SOLUTION IV 1000ML 0.9% SOD CHL

## (undated) DEVICE — SNAP KOVER: Brand: UNBRANDED

## (undated) DEVICE — STERILE POLYISOPRENE POWDER-FREE SURGICAL GLOVES: Brand: PROTEXIS

## (undated) DEVICE — TUBING IRRIG L77IN DIA0.241IN L BOR FOR CYSTO W/ NVENT

## (undated) DEVICE — SOLUTION IRRIG 3000ML H2O STRL BAG

## (undated) DEVICE — OPEN-END FLEXI-TIP URETERAL CATHETER: Brand: FLEXI-TIP

## (undated) DEVICE — SYR LR LCK 1ML GRAD NSAF 30ML --

## (undated) DEVICE — SOLUTION IRRIG 1000ML H2O STRL BLT

## (undated) DEVICE — TRAY CATH 16F URIN MTR LTX -- CONVERT TO ITEM 363111

## (undated) DEVICE — TRAY PREP DRY W/ PREM GLV 2 APPL 6 SPNG 2 UNDPD 1 OVERWRAP